# Patient Record
Sex: FEMALE | Race: WHITE | Employment: FULL TIME | URBAN - METROPOLITAN AREA
[De-identification: names, ages, dates, MRNs, and addresses within clinical notes are randomized per-mention and may not be internally consistent; named-entity substitution may affect disease eponyms.]

---

## 2017-02-15 RX ORDER — SODIUM CHLORIDE, SODIUM LACTATE, POTASSIUM CHLORIDE, CALCIUM CHLORIDE 600; 310; 30; 20 MG/100ML; MG/100ML; MG/100ML; MG/100ML
INJECTION, SOLUTION INTRAVENOUS CONTINUOUS
Status: CANCELLED | OUTPATIENT
Start: 2017-02-15

## 2017-02-16 ENCOUNTER — HOSPITAL ENCOUNTER (OUTPATIENT)
Dept: OTHER | Age: 47
Discharge: HOME | End: 2017-03-07
Attending: INTERNAL MEDICINE | Admitting: INTERNAL MEDICINE

## 2017-02-22 PROBLEM — K56.609 SBO (SMALL BOWEL OBSTRUCTION) (HCC): Status: ACTIVE | Noted: 2017-02-22

## 2017-03-06 ENCOUNTER — TELEPHONE (OUTPATIENT)
Dept: SURGERY | Age: 47
End: 2017-03-06

## 2017-03-06 DIAGNOSIS — R11.2 NAUSEA AND VOMITING, INTRACTABILITY OF VOMITING NOT SPECIFIED, UNSPECIFIED VOMITING TYPE: Primary | ICD-10-CM

## 2017-03-06 RX ORDER — AMOXICILLIN AND CLAVULANATE POTASSIUM 875; 125 MG/1; MG/1
1 TABLET, FILM COATED ORAL 2 TIMES DAILY
Qty: 20 TABLET | Refills: 0 | Status: SHIPPED | OUTPATIENT
Start: 2017-03-06 | End: 2017-03-16

## 2017-03-07 ENCOUNTER — OFFICE VISIT (OUTPATIENT)
Dept: SURGERY | Age: 47
End: 2017-03-07

## 2017-03-07 VITALS
HEIGHT: 65 IN | BODY MASS INDEX: 30.46 KG/M2 | WEIGHT: 182.8 LBS | DIASTOLIC BLOOD PRESSURE: 82 MMHG | SYSTOLIC BLOOD PRESSURE: 120 MMHG

## 2017-03-07 DIAGNOSIS — Z09 POSTOP CHECK: Primary | ICD-10-CM

## 2017-03-07 PROCEDURE — 99024 POSTOP FOLLOW-UP VISIT: CPT | Performed by: SURGERY

## 2017-03-08 RX ORDER — ONDANSETRON 4 MG/1
4 TABLET, FILM COATED ORAL EVERY 8 HOURS PRN
Qty: 20 TABLET | Refills: 0 | OUTPATIENT
Start: 2017-03-08 | End: 2018-01-11 | Stop reason: ALTCHOICE

## 2017-03-14 ENCOUNTER — OFFICE VISIT (OUTPATIENT)
Dept: SURGERY | Age: 47
End: 2017-03-14

## 2017-03-14 VITALS
WEIGHT: 181.2 LBS | HEIGHT: 65 IN | DIASTOLIC BLOOD PRESSURE: 78 MMHG | BODY MASS INDEX: 30.19 KG/M2 | HEART RATE: 72 BPM | SYSTOLIC BLOOD PRESSURE: 118 MMHG

## 2017-03-14 DIAGNOSIS — Z09 POSTOP CHECK: Primary | ICD-10-CM

## 2017-03-14 PROBLEM — T81.49XA POSTOPERATIVE WOUND INFECTION: Status: ACTIVE | Noted: 2017-03-14

## 2017-03-14 PROCEDURE — 99024 POSTOP FOLLOW-UP VISIT: CPT | Performed by: SURGERY

## 2017-04-04 ENCOUNTER — OFFICE VISIT (OUTPATIENT)
Dept: SURGERY | Age: 47
End: 2017-04-04

## 2017-04-04 VITALS
SYSTOLIC BLOOD PRESSURE: 123 MMHG | WEIGHT: 177.8 LBS | BODY MASS INDEX: 28.57 KG/M2 | DIASTOLIC BLOOD PRESSURE: 84 MMHG | HEIGHT: 66 IN | HEART RATE: 86 BPM

## 2017-04-04 DIAGNOSIS — Z09 POSTOP CHECK: Primary | ICD-10-CM

## 2017-04-04 PROCEDURE — 99024 POSTOP FOLLOW-UP VISIT: CPT | Performed by: SURGERY

## 2017-10-20 ENCOUNTER — OFFICE VISIT (OUTPATIENT)
Dept: SURGERY | Age: 47
End: 2017-10-20

## 2017-10-20 VITALS
BODY MASS INDEX: 29.57 KG/M2 | HEART RATE: 85 BPM | DIASTOLIC BLOOD PRESSURE: 81 MMHG | WEIGHT: 184 LBS | SYSTOLIC BLOOD PRESSURE: 116 MMHG | HEIGHT: 66 IN

## 2017-10-20 DIAGNOSIS — K43.2 INCISIONAL HERNIA, WITHOUT OBSTRUCTION OR GANGRENE: ICD-10-CM

## 2017-10-20 PROCEDURE — 99213 OFFICE O/P EST LOW 20 MIN: CPT | Performed by: SURGERY

## 2017-10-20 NOTE — PATIENT INSTRUCTIONS
[address] [address]  Phone: [phone]  Fax: [phone] will be scheduled for surgery with Dr. Jass Vela. · The office will call you with the date and time that surgery is scheduled. · Please take note of these instructions for surgery:  · You should have nothing by mouth after midnight the night before your surgery - this includes no food or water. · Your surgery will be cancelled if you have taken anything by mouth after midnight, NO exceptions. · You will need to have a history and physical prior to your surgery. This will need to be completed up to 30 days before your surgery. This H/P can be completed by your family doctor or the hospital.   · IF you take coumadin (warfarin), please stop taking this medication 5 days prior to your surgery. · IF you take plavix, please stop taking this 7 days prior to your surgery. · Please contact our office if you have a pacemaker or defibrillator. · IF you are allergic to latex, please tell our office prior to your surgery. This is important in know before scheduling your surgery. · IF you are having an out patient surgery, you will need someone available to drive you home after your surgery, and to also stay with you for the rest of the day. · IF you are having a surgery requiring an inpatient stay in the hospital, you will need someone to drive you home upon discharge from the hospital.  · Please contact Dr. Luis Eduardo Geller assistant Braden Alexander if you have any questions or concerns. · Please call the office with any changes in your symptoms or further questions/concerns.  [phone]

## 2017-10-20 NOTE — PROGRESS NOTES
New Patient [address] Surgery Claudy DUNBAR [address] [facility], [address] [facility], [address] [facility], [address] [facility]  Phone: [phone]  Fax: [phone] [facility]   YOB: 1970    Date of Visit:  10/20/2017    No ref. provider found  [place]    HPI:   Hernia: Patient is 52y.o. year old female seen at request of [facility]. Patient presents for evaluation of  midline incisional hernia. Symptoms were first noted a few weeks ago. Pain is dull, intermittent. There is a lump or mass present. Lump is reducible. Pt does not have risk factors of  chronic constipation, chronic cough, difficulty urinating, chronic tobacco abuse. Pt. does not have previous history of prior hernia surgery. Pt underwent ex lap, lysis of adhesions and small bowel resection ~8 months ago for SBO, and pt has had multiple prior abdominal surgeries through her midline.        Allergies   Allergen Reactions    Amoxicillin      dehydration    Eggs [Egg White]      Severe stomach pain    Other      Flu immunization     Outpatient Prescriptions Marked as Taking for the 10/20/17 encounter (Office Visit) with Marilynn Broussard MD   Medication Sig Dispense Refill    ondansetron (ZOFRAN) 4 MG tablet Take 1 tablet by mouth every 8 hours as needed for Nausea or Vomiting 20 tablet 0    Methocarbamol (ROBAXIN-750 PO) Take by mouth 2 times daily      meclizine (ANTIVERT) 12.5 MG tablet Take 12.5 mg by mouth 3 times daily as needed      omeprazole (PRILOSEC) 10 MG delayed release capsule Take 10 mg by mouth daily         Past Medical History:   Diagnosis Date    Anxiety     Fibromyalgia     Irritable bowel syndrome      Past Surgical History:   Procedure Laterality Date    ABDOMEN SURGERY      ruptured bowel    ABDOMINAL ADHESION SURGERY      ABDOMINAL EXPLORATION SURGERY N/A 02/24/2017    explor lap possible lysis of adhsions possible bowel resection    APPENDECTOMY      CHOLECYSTECTOMY      CHOLECYSTECTOMY, LAPAROSCOPIC N/A 3/28/13    LYSIS OF ADHESIONS    CYSTOSCOPY  9/11/12    lyssa stents placed for surgery and removed    HYSTERECTOMY      PELVIC LAPAROSCOPY  7/3/12    ROTATOR CUFF REPAIR      SALPINGO-OOPHORECTOMY  9/11/12    BILATERAL SALPINGO-OOPHORECTOMY; LYSIS OF ADHESIONS;;RETROPERITONEAL RESECTION DISTAL URETERS    SHOULDER ARTHROSCOPY Left 7/8/14    TUBAL LIGATION       Family History   Problem Relation Age of Onset    Diabetes Mother     Cancer Father      liver    Cancer Sister      breast    Cancer Brother      spinal    Cancer Maternal Aunt      Breast    Cancer Paternal Aunt      Breast    Cancer Daughter      Cervical     Social History     Social History    Marital status:      Spouse name: N/A    Number of children: N/A    Years of education: N/A     Occupational History    Not on file. Social History Main Topics    Smoking status: Current Every Day Smoker     Packs/day: 0.50     Years: 20.00     Types: Cigarettes    Smokeless tobacco: Never Used    Alcohol use No    Drug use: No    Sexual activity: No     Other Topics Concern    Not on file     Social History Narrative    No narrative on file          Vitals:    10/20/17 1243   BP: 116/81   Site: Right Arm   Position: Sitting   Cuff Size: Medium Adult   Pulse: 85   Weight: 184 lb (83.5 kg)   Height: 5' 6\" (1.676 m)     Body mass index is 29.7 kg/m².      Wt Readings from Last 3 Encounters:   10/20/17 184 lb (83.5 kg)   04/04/17 177 lb 12.8 oz (80.6 kg)   03/14/17 181 lb 3.2 oz (82.2 kg)     BP Readings from Last 3 Encounters:   10/20/17 116/81   04/04/17 123/84   03/14/17 118/78          REVIEW OF SYSTEMS:   · All other systems reviewed; please refer to HPI with pertinent positives, all other ROS are negative    PHYSICAL EXAM:    CONSTITUTIONAL:  awake, alert, no apparent distress and normal weight  ENT:  normocepalic, without obvious abnormality  NECK:

## 2017-10-27 ENCOUNTER — HOSPITAL ENCOUNTER (OUTPATIENT)
Dept: CT IMAGING | Age: 47
Discharge: HOME | End: 2017-10-27
Attending: SURGERY | Admitting: SURGERY

## 2017-10-27 DIAGNOSIS — K43.2 INCISIONAL HERNIA, WITHOUT OBSTRUCTION OR GANGRENE: ICD-10-CM

## 2018-01-09 ENCOUNTER — PAT TELEPHONE (OUTPATIENT)
Dept: PREADMISSION TESTING | Age: 48
End: 2018-01-09

## 2018-01-11 VITALS — BODY MASS INDEX: 27.48 KG/M2 | WEIGHT: 171 LBS | HEIGHT: 66 IN

## 2018-01-11 RX ORDER — ESOMEPRAZOLE MAGNESIUM 20 MG/1
20 FOR SUSPENSION ORAL DAILY
COMMUNITY
End: 2018-05-16 | Stop reason: DRUGHIGH

## 2018-01-11 RX ORDER — METOPROLOL SUCCINATE 25 MG/1
25 TABLET, EXTENDED RELEASE ORAL DAILY
COMMUNITY

## 2018-01-16 NOTE — ANESTHESIA PRE-OP
Department of Anesthesiology  Preprocedure Note       Name:  Melissa Muir   Age:  52 y.o.  :  1970                                          MRN:  [id number]         Date:  2018      Surgeon:    Procedure:    Medications prior to admission:   Prior to Admission medications    Medication Sig Start Date End Date Taking? Authorizing Provider   metoprolol succinate (TOPROL XL) 25 MG extended release tablet Take 25 mg by mouth daily    Historical Provider, MD   esomeprazole Magnesium (NEXIUM) 20 MG PACK Take 20 mg by mouth daily    Historical Provider, MD   Methocarbamol (ROBAXIN-750 PO) Take by mouth as needed     Historical Provider, MD   meclizine (ANTIVERT) 12.5 MG tablet Take 12.5 mg by mouth 3 times daily as needed    Historical Provider, MD       Current medications:    Current Outpatient Prescriptions   Medication Sig Dispense Refill    metoprolol succinate (TOPROL XL) 25 MG extended release tablet Take 25 mg by mouth daily      esomeprazole Magnesium (NEXIUM) 20 MG PACK Take 20 mg by mouth daily      Methocarbamol (ROBAXIN-750 PO) Take by mouth as needed       meclizine (ANTIVERT) 12.5 MG tablet Take 12.5 mg by mouth 3 times daily as needed       No current facility-administered medications for this encounter. Allergies:     Allergies   Allergen Reactions    Amoxicillin      dehydration    Eggs [Egg White]      Severe stomach pain    Other Other (See Comments)     Flu immunization; severely sick due to egg whites in shot       Problem List:    Patient Active Problem List   Diagnosis Code    Nausea R11.0    Ovarian cyst N83.209    Pelvic pain in female R10.2    Depression screening Z13.89    Chronic pelvic pain in female R10.2, G89.29    Postop check Z09    Chest pain R07.9    IBS (irritable bowel syndrome) K58.9    Anxiety F41.9    Flank pain R10.9    Impingement syndrome of shoulder M75.40    SBO (small bowel obstruction) K56.609    Generalized abdominal pain R10.84   

## 2018-01-17 ENCOUNTER — HOSPITAL ENCOUNTER (OUTPATIENT)
Dept: SURGERY | Age: 48
Discharge: HOME | End: 2018-01-17
Attending: SURGERY | Admitting: SURGERY

## 2018-01-17 VITALS
OXYGEN SATURATION: 94 % | DIASTOLIC BLOOD PRESSURE: 67 MMHG | RESPIRATION RATE: 16 BRPM | SYSTOLIC BLOOD PRESSURE: 97 MMHG | WEIGHT: 183 LBS | HEART RATE: 75 BPM | TEMPERATURE: 99.4 F | HEIGHT: 66 IN | BODY MASS INDEX: 29.41 KG/M2

## 2018-01-17 DIAGNOSIS — K43.2 INCISIONAL HERNIA, WITHOUT OBSTRUCTION OR GANGRENE: Primary | ICD-10-CM

## 2018-01-17 PROCEDURE — 49654 PR LAP, INCISIONAL HERNIA REPAIR,REDUCIBLE: CPT | Performed by: SURGERY

## 2018-01-17 RX ORDER — OXYCODONE HYDROCHLORIDE AND ACETAMINOPHEN 5; 325 MG/1; MG/1
1 TABLET ORAL PRN
Status: ACTIVE | OUTPATIENT
Start: 2018-01-17 | End: 2018-01-17

## 2018-01-17 RX ORDER — MEPERIDINE HYDROCHLORIDE 50 MG/ML
12.5 INJECTION INTRAMUSCULAR; INTRAVENOUS; SUBCUTANEOUS EVERY 5 MIN PRN
Status: DISCONTINUED | OUTPATIENT
Start: 2018-01-17 | End: 2018-01-18 | Stop reason: HOSPADM

## 2018-01-17 RX ORDER — SODIUM CHLORIDE 0.9 % (FLUSH) 0.9 %
10 SYRINGE (ML) INJECTION PRN
Status: DISCONTINUED | OUTPATIENT
Start: 2018-01-17 | End: 2018-01-18 | Stop reason: HOSPADM

## 2018-01-17 RX ORDER — MORPHINE SULFATE 2 MG/ML
2 INJECTION, SOLUTION INTRAMUSCULAR; INTRAVENOUS EVERY 5 MIN PRN
Status: DISCONTINUED | OUTPATIENT
Start: 2018-01-17 | End: 2018-01-18 | Stop reason: HOSPADM

## 2018-01-17 RX ORDER — HYDRALAZINE HYDROCHLORIDE 20 MG/ML
5 INJECTION INTRAMUSCULAR; INTRAVENOUS
Status: DISCONTINUED | OUTPATIENT
Start: 2018-01-17 | End: 2018-01-18 | Stop reason: HOSPADM

## 2018-01-17 RX ORDER — DIPHENHYDRAMINE HYDROCHLORIDE 50 MG/ML
12.5 INJECTION INTRAMUSCULAR; INTRAVENOUS
Status: ACTIVE | OUTPATIENT
Start: 2018-01-17 | End: 2018-01-17

## 2018-01-17 RX ORDER — OXYCODONE HYDROCHLORIDE AND ACETAMINOPHEN 5; 325 MG/1; MG/1
1-2 TABLET ORAL EVERY 4 HOURS PRN
Qty: 30 TABLET | Refills: 0 | Status: SHIPPED | OUTPATIENT
Start: 2018-01-17 | End: 2018-01-24

## 2018-01-17 RX ORDER — OXYCODONE HYDROCHLORIDE AND ACETAMINOPHEN 5; 325 MG/1; MG/1
2 TABLET ORAL PRN
Status: ACTIVE | OUTPATIENT
Start: 2018-01-17 | End: 2018-01-17

## 2018-01-17 RX ORDER — SODIUM CHLORIDE, SODIUM LACTATE, POTASSIUM CHLORIDE, CALCIUM CHLORIDE 600; 310; 30; 20 MG/100ML; MG/100ML; MG/100ML; MG/100ML
INJECTION, SOLUTION INTRAVENOUS
Status: DISPENSED
Start: 2018-01-17 | End: 2018-01-17

## 2018-01-17 RX ORDER — LIDOCAINE HYDROCHLORIDE 10 MG/ML
1 INJECTION, SOLUTION EPIDURAL; INFILTRATION; INTRACAUDAL; PERINEURAL
Status: ACTIVE | OUTPATIENT
Start: 2018-01-17 | End: 2018-01-17

## 2018-01-17 RX ORDER — MORPHINE SULFATE 2 MG/ML
1 INJECTION, SOLUTION INTRAMUSCULAR; INTRAVENOUS EVERY 5 MIN PRN
Status: DISCONTINUED | OUTPATIENT
Start: 2018-01-17 | End: 2018-01-18 | Stop reason: HOSPADM

## 2018-01-17 RX ORDER — SODIUM CHLORIDE, SODIUM LACTATE, POTASSIUM CHLORIDE, CALCIUM CHLORIDE 600; 310; 30; 20 MG/100ML; MG/100ML; MG/100ML; MG/100ML
INJECTION, SOLUTION INTRAVENOUS CONTINUOUS
Status: DISCONTINUED | OUTPATIENT
Start: 2018-01-17 | End: 2018-01-18 | Stop reason: HOSPADM

## 2018-01-17 RX ORDER — ONDANSETRON 2 MG/ML
4 INJECTION INTRAMUSCULAR; INTRAVENOUS
Status: ACTIVE | OUTPATIENT
Start: 2018-01-17 | End: 2018-01-17

## 2018-01-17 RX ORDER — LABETALOL HYDROCHLORIDE 5 MG/ML
5 INJECTION, SOLUTION INTRAVENOUS EVERY 10 MIN PRN
Status: DISCONTINUED | OUTPATIENT
Start: 2018-01-17 | End: 2018-01-18 | Stop reason: HOSPADM

## 2018-01-17 RX ORDER — SODIUM CHLORIDE 0.9 % (FLUSH) 0.9 %
10 SYRINGE (ML) INJECTION EVERY 12 HOURS SCHEDULED
Status: DISCONTINUED | OUTPATIENT
Start: 2018-01-17 | End: 2018-01-18 | Stop reason: HOSPADM

## 2018-01-17 RX ADMIN — Medication 0.5 MG: at 11:29

## 2018-01-17 RX ADMIN — Medication 0.5 MG: at 11:39

## 2018-01-17 RX ADMIN — Medication 0.25 MG: at 11:24

## 2018-01-17 RX ADMIN — Medication 0.25 MG: at 11:20

## 2018-01-17 ASSESSMENT — PAIN SCALES - GENERAL
PAINLEVEL_OUTOF10: 0
PAINLEVEL_OUTOF10: 7
PAINLEVEL_OUTOF10: 0
PAINLEVEL_OUTOF10: 6
PAINLEVEL_OUTOF10: 5
PAINLEVEL_OUTOF10: 7

## 2018-01-17 ASSESSMENT — PAIN - FUNCTIONAL ASSESSMENT: PAIN_FUNCTIONAL_ASSESSMENT: 0-10

## 2018-01-17 NOTE — PROGRESS NOTES
Transfer received from PACU- report received. Pt sleeping, arouses to voice, denies pain. Repositioned with HOB elevated. Family called to bedside, call light in reach.

## 2018-01-17 NOTE — ANESTHESIA POST-OP
Postoperative Anesthesia Note    Name:    Fidel Tian  MRN:      [id number]    Patient Vitals for the past 12 hrs:   BP Temp Temp src Pulse Resp SpO2 Height Weight   01/17/18 1315 - - - - - 94 % - -   01/17/18 1243 97/67 - - 75 16 93 % - -   01/17/18 1215 - - - - - (!) 79 % - -   01/17/18 1203 109/74 99.4 °F (37.4 °C) Temporal 77 12 90 % - -   01/17/18 1152 - - - 93 19 96 % - -   01/17/18 1150 - - - 86 28 94 % - -   01/17/18 1145 121/76 - - 96 17 97 % - -   01/17/18 1130 126/77 - - 95 12 (!) 88 % - -   01/17/18 1121 - 97.9 °F (36.6 °C) Temporal - - - - -   01/17/18 1120 132/81 - - 92 11 94 % - -   01/17/18 1115 122/85 - - 102 14 93 % - -   01/17/18 1108 130/81 - - 73 11 98 % - -   01/17/18 1100 130/77 - - 95 12 (!) 86 % - -   01/17/18 1050 130/72 - - 110 18 97 % - -   01/17/18 1045 136/89 - - 99 10 (!) 84 % - -   01/17/18 1040 (!) 150/95 98.4 °F (36.9 °C) Temporal 103 16 93 % - -   01/17/18 0647 98/64 97.6 °F (36.4 °C) Temporal 76 16 97 % 5' 6\" (1.676 m) 183 lb (83 kg)        LABS:    CBC  Lab Results   Component Value Date/Time    WBC 8.0 03/02/2017 10:27 AM    HGB 12.1 03/02/2017 10:27 AM    HCT 35.3 (L) 03/02/2017 10:27 AM     03/02/2017 10:27 AM     RENAL  Lab Results   Component Value Date/Time     02/26/2017 07:02 AM    K 3.8 02/26/2017 07:02 AM    CL 97 (L) 02/26/2017 07:02 AM    CO2 17 (L) 02/26/2017 07:02 AM    BUN 14 02/26/2017 07:02 AM    CREATININE <0.5 (L) 02/26/2017 07:02 AM    GLUCOSE 81 02/26/2017 07:02 AM     COAGS  Lab Results   Component Value Date/Time    PROTIME 10.9 11/19/2015 11:45 PM    INR 0.96 11/19/2015 11:45 PM    APTT 32.6 02/19/2013 06:33 PM       Intake & Output: In: 1400 [I.V.:1400]  Out: 10     Nausea & Vomiting:  No    Level of Consciousness:  Awake    Pain Assessment:  Adequate analgesia    Anesthesia Complications:  No apparent anesthetic complications    SUMMARY      Vital signs stable  OK to discharge from Stage I post anesthesia care.   Care transferred from

## 2018-01-18 LAB
EKG ATRIAL RATE: 66 BPM
EKG DIAGNOSIS: NORMAL
EKG P AXIS: 39 DEGREES
EKG P-R INTERVAL: 188 MS
EKG Q-T INTERVAL: 414 MS
EKG QRS DURATION: 92 MS
EKG QTC CALCULATION (BAZETT): 434 MS
EKG R AXIS: 22 DEGREES
EKG T AXIS: 17 DEGREES
EKG VENTRICULAR RATE: 66 BPM

## 2018-01-18 PROCEDURE — 93010 ELECTROCARDIOGRAM REPORT: CPT | Performed by: INTERNAL MEDICINE

## 2018-01-18 NOTE — OP NOTE
of the mesh. The mesh was then folded and inserted  into the abdominal cavity. We positioned the mesh with the abdominal wall  side facing outward and the assistant was then be able to pass the suture  grasper through stab incisions and grasped the Vicryl sutures to pull mesh  up and secured in the midline and centering along the fascial closure line. We now used 2 running 0 barbed sutures to secure the perimeter of the  mesh which was accomplished without difficulty. The robot was undocked. Final steps for fixation of mesh were to place the  total of four #1 [place] sutures in a transfascial manner at the 12, 3, 6,  9 o'clock positions on the mesh. This was done under laparoscopic guidance  with this suture passed down through stab incisions and the skin to the  abdominal wall and mesh and backup with approximately 1 cm bite of the  mesh. This secured the mesh nicely and I was satisfied with the fixation  at this point. The 12-mm trocar was removed and a figure-of-eight 0 Vicryl  suture passed around the fascial defect under laparoscopic guidance. The  remaining trocars were then removed and the insufflation released. The  skin incisions were closed 4-0 Monocryl subcuticular sutures and Dermabond. The patient was then extubated and taken to the recovery room in stable  condition.         Devaughn Strickland MD    D: 01/18/2018 8:03:17       T: 01/18/2018 8:05:20     [email or web address]  Job#: [id number]     Doc#: [id number]    CC:  Bryan Garay

## 2018-01-30 ENCOUNTER — OFFICE VISIT (OUTPATIENT)
Dept: SURGERY | Age: 48
End: 2018-01-30

## 2018-01-30 VITALS
SYSTOLIC BLOOD PRESSURE: 122 MMHG | WEIGHT: 182 LBS | HEIGHT: 66 IN | HEART RATE: 88 BPM | DIASTOLIC BLOOD PRESSURE: 89 MMHG | BODY MASS INDEX: 29.25 KG/M2

## 2018-01-30 DIAGNOSIS — Z09 POSTOP CHECK: Primary | ICD-10-CM

## 2018-01-30 PROCEDURE — 99024 POSTOP FOLLOW-UP VISIT: CPT | Performed by: SURGERY

## 2018-01-30 NOTE — PROGRESS NOTES
Surgery Post-op Progress Note    HPI:  Notes reviewed, and agree with documentation in pt's chart. Postoperative Follow-up: Patient presents for 2 week follow-up status post robotic repair of incisional hernia w/ mesh . Eating a regular diet without difficulty. Bowel movements are Normal.  Pain is controlled without any medications. ROS:    · 10 point review of systems performed; please refer to HPI with pertinent positives, all other ROS are negative    PE:   CONSTITUTIONAL:  awake and alert    ABDOMEN: soft, non-distended, non-tender     INCISION: clean, dry, no drainage, healing      ASSESSMENT:  1.  Postop check     ·       PLAN:    Continue with routine wound care as discussed  Gradually increase activities as tolerated  Follow-up as needed; please call with questions or concerns  ·

## 2018-05-16 ENCOUNTER — INITIAL CONSULT (OUTPATIENT)
Dept: GASTROENTEROLOGY | Age: 48
End: 2018-05-16

## 2018-05-16 VITALS
WEIGHT: 185 LBS | DIASTOLIC BLOOD PRESSURE: 88 MMHG | HEIGHT: 66 IN | BODY MASS INDEX: 29.73 KG/M2 | SYSTOLIC BLOOD PRESSURE: 120 MMHG

## 2018-05-16 DIAGNOSIS — R13.10 DYSPHAGIA, UNSPECIFIED TYPE: ICD-10-CM

## 2018-05-16 DIAGNOSIS — K21.9 GASTROESOPHAGEAL REFLUX DISEASE, ESOPHAGITIS PRESENCE NOT SPECIFIED: Primary | ICD-10-CM

## 2018-05-16 DIAGNOSIS — Z86.010 HISTORY OF COLON POLYPS: ICD-10-CM

## 2018-05-16 DIAGNOSIS — R19.8 ALTERNATING CONSTIPATION AND DIARRHEA: ICD-10-CM

## 2018-05-16 PROBLEM — Z86.0100 HISTORY OF COLON POLYPS: Status: ACTIVE | Noted: 2018-05-16

## 2018-05-16 PROCEDURE — 99204 OFFICE O/P NEW MOD 45 MIN: CPT | Performed by: INTERNAL MEDICINE

## 2018-05-16 RX ORDER — ESOMEPRAZOLE MAGNESIUM 40 MG/1
40 CAPSULE, DELAYED RELEASE ORAL
Qty: 60 CAPSULE | Refills: 3 | Status: SHIPPED | OUTPATIENT
Start: 2018-05-16 | End: 2018-11-19 | Stop reason: SDUPTHER

## 2018-05-29 ENCOUNTER — TELEPHONE (OUTPATIENT)
Dept: GASTROENTEROLOGY | Age: 48
End: 2018-05-29

## 2018-05-30 ENCOUNTER — HOSPITAL ENCOUNTER (OUTPATIENT)
Dept: OTHER | Age: 48
Discharge: HOME | End: 2018-05-31

## 2018-05-30 ENCOUNTER — HOSPITAL ENCOUNTER (OUTPATIENT)
Dept: SURGERY | Age: 48
Discharge: HOME | End: 2018-05-30
Attending: INTERNAL MEDICINE | Admitting: INTERNAL MEDICINE

## 2018-05-30 VITALS
HEART RATE: 69 BPM | DIASTOLIC BLOOD PRESSURE: 62 MMHG | OXYGEN SATURATION: 100 % | RESPIRATION RATE: 18 BRPM | TEMPERATURE: 97.8 F | BODY MASS INDEX: 29.73 KG/M2 | SYSTOLIC BLOOD PRESSURE: 112 MMHG | WEIGHT: 185 LBS | HEIGHT: 66 IN

## 2018-05-30 DIAGNOSIS — K22.2 LOWER ESOPHAGEAL RING: ICD-10-CM

## 2018-05-30 PROCEDURE — 43249 ESOPH EGD DILATION <30 MM: CPT | Performed by: INTERNAL MEDICINE

## 2018-05-30 PROCEDURE — 45378 DIAGNOSTIC COLONOSCOPY: CPT | Performed by: INTERNAL MEDICINE

## 2018-05-30 PROCEDURE — 43239 EGD BIOPSY SINGLE/MULTIPLE: CPT | Performed by: INTERNAL MEDICINE

## 2018-05-30 RX ORDER — DIPHENHYDRAMINE HYDROCHLORIDE 50 MG/ML
12.5 INJECTION INTRAMUSCULAR; INTRAVENOUS
Status: ACTIVE | OUTPATIENT
Start: 2018-05-30 | End: 2018-05-30

## 2018-05-30 RX ORDER — HYDRALAZINE HYDROCHLORIDE 20 MG/ML
5 INJECTION INTRAMUSCULAR; INTRAVENOUS EVERY 10 MIN PRN
Status: DISCONTINUED | OUTPATIENT
Start: 2018-05-30 | End: 2018-05-31 | Stop reason: HOSPADM

## 2018-05-30 RX ORDER — MORPHINE SULFATE 10 MG/ML
1 INJECTION, SOLUTION INTRAMUSCULAR; INTRAVENOUS EVERY 5 MIN PRN
Status: DISCONTINUED | OUTPATIENT
Start: 2018-05-30 | End: 2018-05-31 | Stop reason: HOSPADM

## 2018-05-30 RX ORDER — OXYCODONE HYDROCHLORIDE AND ACETAMINOPHEN 5; 325 MG/1; MG/1
2 TABLET ORAL PRN
Status: ACTIVE | OUTPATIENT
Start: 2018-05-30 | End: 2018-05-30

## 2018-05-30 RX ORDER — OXYCODONE HYDROCHLORIDE AND ACETAMINOPHEN 5; 325 MG/1; MG/1
1 TABLET ORAL PRN
Status: ACTIVE | OUTPATIENT
Start: 2018-05-30 | End: 2018-05-30

## 2018-05-30 RX ORDER — MEPERIDINE HYDROCHLORIDE 25 MG/ML
12.5 INJECTION INTRAMUSCULAR; INTRAVENOUS; SUBCUTANEOUS EVERY 5 MIN PRN
Status: DISCONTINUED | OUTPATIENT
Start: 2018-05-30 | End: 2018-05-31 | Stop reason: HOSPADM

## 2018-05-30 RX ORDER — ONDANSETRON 2 MG/ML
4 INJECTION INTRAMUSCULAR; INTRAVENOUS PRN
Status: DISCONTINUED | OUTPATIENT
Start: 2018-05-30 | End: 2018-05-31 | Stop reason: HOSPADM

## 2018-05-30 RX ORDER — MORPHINE SULFATE 10 MG/ML
2 INJECTION, SOLUTION INTRAMUSCULAR; INTRAVENOUS EVERY 5 MIN PRN
Status: DISCONTINUED | OUTPATIENT
Start: 2018-05-30 | End: 2018-05-31 | Stop reason: HOSPADM

## 2018-05-30 RX ORDER — PROMETHAZINE HYDROCHLORIDE 25 MG/ML
6.25 INJECTION, SOLUTION INTRAMUSCULAR; INTRAVENOUS
Status: DISCONTINUED | OUTPATIENT
Start: 2018-05-30 | End: 2018-05-31 | Stop reason: HOSPADM

## 2018-05-30 RX ORDER — LABETALOL HYDROCHLORIDE 5 MG/ML
5 INJECTION, SOLUTION INTRAVENOUS EVERY 10 MIN PRN
Status: DISCONTINUED | OUTPATIENT
Start: 2018-05-30 | End: 2018-05-31 | Stop reason: HOSPADM

## 2018-05-30 ASSESSMENT — PAIN - FUNCTIONAL ASSESSMENT: PAIN_FUNCTIONAL_ASSESSMENT: 0-10

## 2018-05-30 ASSESSMENT — PAIN SCALES - GENERAL
PAINLEVEL_OUTOF10: 0

## 2018-06-01 ENCOUNTER — TELEPHONE (OUTPATIENT)
Dept: GASTROENTEROLOGY | Age: 48
End: 2018-06-01

## 2018-06-04 DIAGNOSIS — A04.8 H. PYLORI INFECTION: Primary | ICD-10-CM

## 2018-06-04 RX ORDER — METRONIDAZOLE 250 MG/1
250 TABLET ORAL 4 TIMES DAILY
Qty: 56 TABLET | Refills: 0 | Status: SHIPPED | OUTPATIENT
Start: 2018-06-04 | End: 2018-06-18

## 2018-06-04 RX ORDER — TETRACYCLINE HYDROCHLORIDE 500 MG/1
500 CAPSULE ORAL 4 TIMES DAILY
Qty: 56 CAPSULE | Refills: 0 | Status: SHIPPED | OUTPATIENT
Start: 2018-06-04 | End: 2018-06-26 | Stop reason: ALTCHOICE

## 2018-06-26 ENCOUNTER — OFFICE VISIT (OUTPATIENT)
Dept: GASTROENTEROLOGY | Age: 48
End: 2018-06-26

## 2018-06-26 ENCOUNTER — HOSPITAL ENCOUNTER (OUTPATIENT)
Dept: OTHER | Age: 48
Discharge: HOME | End: 2018-06-26
Attending: FAMILY MEDICINE | Admitting: FAMILY MEDICINE

## 2018-06-26 VITALS
HEIGHT: 66 IN | WEIGHT: 185.6 LBS | DIASTOLIC BLOOD PRESSURE: 80 MMHG | BODY MASS INDEX: 29.83 KG/M2 | SYSTOLIC BLOOD PRESSURE: 118 MMHG

## 2018-06-26 DIAGNOSIS — K29.70 HELICOBACTER POSITIVE GASTRITIS: ICD-10-CM

## 2018-06-26 DIAGNOSIS — K76.0 FATTY LIVER DISEASE, NONALCOHOLIC: ICD-10-CM

## 2018-06-26 DIAGNOSIS — R74.8 ABNORMAL LIVER ENZYMES: ICD-10-CM

## 2018-06-26 DIAGNOSIS — R13.19 ESOPHAGEAL DYSPHAGIA: ICD-10-CM

## 2018-06-26 DIAGNOSIS — B96.81 HELICOBACTER POSITIVE GASTRITIS: ICD-10-CM

## 2018-06-26 DIAGNOSIS — R19.8 ALTERNATING CONSTIPATION AND DIARRHEA: ICD-10-CM

## 2018-06-26 DIAGNOSIS — K22.2 LOWER ESOPHAGEAL RING: Primary | ICD-10-CM

## 2018-06-26 DIAGNOSIS — K21.9 GASTROESOPHAGEAL REFLUX DISEASE WITHOUT ESOPHAGITIS: ICD-10-CM

## 2018-06-26 LAB
ALBUMIN SERPL-MCNC: 3.9 G/DL (ref 3.4–5)
ALP BLD-CCNC: 127 U/L (ref 40–129)
ALT SERPL-CCNC: 27 U/L (ref 10–40)
AST SERPL-CCNC: 24 U/L (ref 15–37)
BILIRUB SERPL-MCNC: 0.3 MG/DL (ref 0–1)
BILIRUBIN DIRECT: <0.2 MG/DL (ref 0–0.3)
BILIRUBIN, INDIRECT: NORMAL MG/DL (ref 0–1)
FERRITIN: 175.1 NG/ML (ref 15–150)
HCT VFR BLD CALC: 40 % (ref 36–48)
HEMOGLOBIN: 14 G/DL (ref 12–16)
HEPATITIS B SURFACE ANTIGEN INTERPRETATION: NORMAL
HEPATITIS C ANTIBODY INTERPRETATION: NORMAL
IGA: 130 MG/DL (ref 70–400)
IRON SATURATION: 31 % (ref 15–50)
IRON: 63 UG/DL (ref 37–145)
MCH RBC QN AUTO: 33 PG (ref 26–34)
MCHC RBC AUTO-ENTMCNC: 35.1 G/DL (ref 31–36)
MCV RBC AUTO: 94 FL (ref 80–100)
PDW BLD-RTO: 12.8 % (ref 12.4–15.4)
PLATELET # BLD: 245 K/UL (ref 135–450)
PMV BLD AUTO: 9.6 FL (ref 5–10.5)
RBC # BLD: 4.25 M/UL (ref 4–5.2)
TOTAL IRON BINDING CAPACITY: 202 UG/DL (ref 260–445)
TOTAL PROTEIN: 6.7 G/DL (ref 6.4–8.2)
TSH REFLEX: 1.53 UIU/ML (ref 0.27–4.2)
WBC # BLD: 7.3 K/UL (ref 4–11)

## 2018-06-26 PROCEDURE — 99213 OFFICE O/P EST LOW 20 MIN: CPT | Performed by: INTERNAL MEDICINE

## 2018-06-28 LAB
MITOCHONDRIAL M2 AB, IGG: 2.8 UNITS (ref 0–20)
TISSUE TRANSGLUTAMINASE IGA: 0 U/ML (ref 0–3)

## 2018-06-30 LAB — ANA BY IFA: NORMAL

## 2018-07-19 ENCOUNTER — APPOINTMENT (OUTPATIENT)
Dept: CT IMAGING | Age: 48
End: 2018-07-19
Payer: COMMERCIAL

## 2018-07-19 ENCOUNTER — APPOINTMENT (OUTPATIENT)
Dept: GENERAL RADIOLOGY | Age: 48
End: 2018-07-19
Payer: COMMERCIAL

## 2018-07-19 ENCOUNTER — HOSPITAL ENCOUNTER (EMERGENCY)
Age: 48
Discharge: HOME | End: 2018-07-19
Attending: EMERGENCY MEDICINE
Payer: COMMERCIAL

## 2018-07-19 VITALS
WEIGHT: 185 LBS | HEART RATE: 64 BPM | TEMPERATURE: 97.9 F | SYSTOLIC BLOOD PRESSURE: 135 MMHG | BODY MASS INDEX: 29.73 KG/M2 | RESPIRATION RATE: 15 BRPM | OXYGEN SATURATION: 99 % | HEIGHT: 66 IN | DIASTOLIC BLOOD PRESSURE: 87 MMHG

## 2018-07-19 DIAGNOSIS — R07.9 CHEST PAIN, UNSPECIFIED TYPE: ICD-10-CM

## 2018-07-19 DIAGNOSIS — K76.0 FATTY LIVER: ICD-10-CM

## 2018-07-19 DIAGNOSIS — R10.84 GENERALIZED ABDOMINAL PAIN: Primary | ICD-10-CM

## 2018-07-19 DIAGNOSIS — R10.13 ABDOMINAL PAIN, EPIGASTRIC: ICD-10-CM

## 2018-07-19 LAB
A/G RATIO: 1.4 (ref 1.1–2.2)
ALBUMIN SERPL-MCNC: 3.9 G/DL (ref 3.4–5)
ALP BLD-CCNC: 116 U/L (ref 40–129)
ALT SERPL-CCNC: 20 U/L (ref 10–40)
ANION GAP SERPL CALCULATED.3IONS-SCNC: 11 MMOL/L (ref 3–16)
AST SERPL-CCNC: 17 U/L (ref 15–37)
BASOPHILS ABSOLUTE: 0.1 K/UL (ref 0–0.2)
BASOPHILS RELATIVE PERCENT: 0.8 %
BILIRUB SERPL-MCNC: 0.4 MG/DL (ref 0–1)
BILIRUBIN URINE: NEGATIVE
BLOOD, URINE: NEGATIVE
BUN BLDV-MCNC: 10 MG/DL (ref 7–20)
CALCIUM SERPL-MCNC: 8.9 MG/DL (ref 8.3–10.6)
CHLORIDE BLD-SCNC: 104 MMOL/L (ref 99–110)
CLARITY: CLEAR
CO2: 26 MMOL/L (ref 21–32)
COLOR: NORMAL
CREAT SERPL-MCNC: 0.6 MG/DL (ref 0.6–1.1)
EKG ATRIAL RATE: 61 BPM
EKG ATRIAL RATE: 70 BPM
EKG DIAGNOSIS: NORMAL
EKG DIAGNOSIS: NORMAL
EKG P AXIS: 43 DEGREES
EKG P AXIS: 50 DEGREES
EKG P-R INTERVAL: 170 MS
EKG P-R INTERVAL: 192 MS
EKG Q-T INTERVAL: 400 MS
EKG Q-T INTERVAL: 426 MS
EKG QRS DURATION: 94 MS
EKG QRS DURATION: 96 MS
EKG QTC CALCULATION (BAZETT): 428 MS
EKG QTC CALCULATION (BAZETT): 432 MS
EKG R AXIS: 15 DEGREES
EKG R AXIS: 21 DEGREES
EKG T AXIS: 12 DEGREES
EKG T AXIS: 18 DEGREES
EKG VENTRICULAR RATE: 61 BPM
EKG VENTRICULAR RATE: 70 BPM
EOSINOPHILS ABSOLUTE: 0.2 K/UL (ref 0–0.6)
EOSINOPHILS RELATIVE PERCENT: 3.1 %
GFR AFRICAN AMERICAN: >60
GFR NON-AFRICAN AMERICAN: >60
GLOBULIN: 2.8 G/DL
GLUCOSE BLD-MCNC: 94 MG/DL (ref 70–99)
GLUCOSE URINE: NEGATIVE MG/DL
HCT VFR BLD CALC: 37.9 % (ref 36–48)
HEMOGLOBIN: 13.4 G/DL (ref 12–16)
INR BLD: 1.02 (ref 0.86–1.14)
KETONES, URINE: NEGATIVE MG/DL
LEUKOCYTE ESTERASE, URINE: NEGATIVE
LIPASE: 22 U/L (ref 13–60)
LYMPHOCYTES ABSOLUTE: 1.9 K/UL (ref 1–5.1)
LYMPHOCYTES RELATIVE PERCENT: 30.5 %
MCH RBC QN AUTO: 32.3 PG (ref 26–34)
MCHC RBC AUTO-ENTMCNC: 35.4 G/DL (ref 31–36)
MCV RBC AUTO: 91.3 FL (ref 80–100)
MICROSCOPIC EXAMINATION: NORMAL
MONOCYTES ABSOLUTE: 0.5 K/UL (ref 0–1.3)
MONOCYTES RELATIVE PERCENT: 7.8 %
NEUTROPHILS ABSOLUTE: 3.7 K/UL (ref 1.7–7.7)
NEUTROPHILS RELATIVE PERCENT: 57.8 %
NITRITE, URINE: NEGATIVE
PDW BLD-RTO: 12.7 % (ref 12.4–15.4)
PH UA: 6
PLATELET # BLD: 229 K/UL (ref 135–450)
PMV BLD AUTO: 8.2 FL (ref 5–10.5)
POTASSIUM SERPL-SCNC: 4 MMOL/L (ref 3.5–5.1)
PROTEIN UA: NEGATIVE MG/DL
PROTHROMBIN TIME: 11.6 SEC (ref 9.8–13)
RBC # BLD: 4.15 M/UL (ref 4–5.2)
SODIUM BLD-SCNC: 141 MMOL/L (ref 136–145)
SPECIFIC GRAVITY UA: 1.01
TOTAL PROTEIN: 6.7 G/DL (ref 6.4–8.2)
TROPONIN: <0.01 NG/ML
TROPONIN: <0.01 NG/ML
URINE TYPE: NORMAL
UROBILINOGEN, URINE: 0.2 E.U./DL
WBC # BLD: 6.3 K/UL (ref 4–11)

## 2018-07-19 PROCEDURE — 99285 EMERGENCY DEPT VISIT HI MDM: CPT

## 2018-07-19 PROCEDURE — 85025 COMPLETE CBC W/AUTO DIFF WBC: CPT

## 2018-07-19 PROCEDURE — 2580000003 HC RX 258: Performed by: PHYSICIAN ASSISTANT

## 2018-07-19 PROCEDURE — 93005 ELECTROCARDIOGRAM TRACING: CPT | Performed by: EMERGENCY MEDICINE

## 2018-07-19 PROCEDURE — 6360000002 HC RX W HCPCS: Performed by: PHYSICIAN ASSISTANT

## 2018-07-19 PROCEDURE — 74177 CT ABD & PELVIS W/CONTRAST: CPT

## 2018-07-19 PROCEDURE — 93005 ELECTROCARDIOGRAM TRACING: CPT | Performed by: PHYSICIAN ASSISTANT

## 2018-07-19 PROCEDURE — 93010 ELECTROCARDIOGRAM REPORT: CPT | Performed by: INTERNAL MEDICINE

## 2018-07-19 PROCEDURE — 71046 X-RAY EXAM CHEST 2 VIEWS: CPT

## 2018-07-19 PROCEDURE — 85610 PROTHROMBIN TIME: CPT

## 2018-07-19 PROCEDURE — 6370000000 HC RX 637 (ALT 250 FOR IP): Performed by: EMERGENCY MEDICINE

## 2018-07-19 PROCEDURE — 84484 ASSAY OF TROPONIN QUANT: CPT

## 2018-07-19 PROCEDURE — 96374 THER/PROPH/DIAG INJ IV PUSH: CPT

## 2018-07-19 PROCEDURE — 80053 COMPREHEN METABOLIC PANEL: CPT

## 2018-07-19 PROCEDURE — 83690 ASSAY OF LIPASE: CPT

## 2018-07-19 PROCEDURE — 81003 URINALYSIS AUTO W/O SCOPE: CPT

## 2018-07-19 PROCEDURE — 6360000004 HC RX CONTRAST MEDICATION: Performed by: EMERGENCY MEDICINE

## 2018-07-19 PROCEDURE — 96361 HYDRATE IV INFUSION ADD-ON: CPT

## 2018-07-19 RX ORDER — ONDANSETRON 2 MG/ML
4 INJECTION INTRAMUSCULAR; INTRAVENOUS ONCE
Status: COMPLETED | OUTPATIENT
Start: 2018-07-19 | End: 2018-07-19

## 2018-07-19 RX ORDER — 0.9 % SODIUM CHLORIDE 0.9 %
1000 INTRAVENOUS SOLUTION INTRAVENOUS ONCE
Status: COMPLETED | OUTPATIENT
Start: 2018-07-19 | End: 2018-07-19

## 2018-07-19 RX ORDER — ONDANSETRON 4 MG/1
4 TABLET, FILM COATED ORAL EVERY 8 HOURS PRN
Qty: 15 TABLET | Refills: 0 | Status: ON HOLD | OUTPATIENT
Start: 2018-07-19 | End: 2018-08-01

## 2018-07-19 RX ORDER — SUCRALFATE ORAL 1 G/10ML
1 SUSPENSION ORAL 4 TIMES DAILY
Qty: 1200 ML | Refills: 0 | Status: ON HOLD | OUTPATIENT
Start: 2018-07-19 | End: 2018-08-01

## 2018-07-19 RX ADMIN — ONDANSETRON 4 MG: 2 SOLUTION INTRAMUSCULAR; INTRAVENOUS at 12:53

## 2018-07-19 RX ADMIN — IOPAMIDOL 100 ML: 755 INJECTION, SOLUTION INTRAVENOUS at 14:56

## 2018-07-19 RX ADMIN — LIDOCAINE HYDROCHLORIDE: 20 SOLUTION ORAL; TOPICAL at 15:57

## 2018-07-19 RX ADMIN — SODIUM CHLORIDE 1000 ML: 9 INJECTION, SOLUTION INTRAVENOUS at 14:40

## 2018-07-19 ASSESSMENT — PAIN SCALES - GENERAL: PAINLEVEL_OUTOF10: 6

## 2018-07-19 NOTE — ED NOTES
Discharge instructions reviewed with Pt. Pt verbalizes understanding at this time. Prescriptions/medications reviewed with pt at this time. VS WNL. Pt condition stable at this time. No concerns voiced.       Haleigh Koroma RN  07/19/18 2941

## 2018-07-19 NOTE — ED PROVIDER NOTES
Breathing is unlabored. Speaking comfortably in full sentences. Lungs clear to auscultation bilaterally. No wheezing, rales or rhonchi noted. Abdomen: Nondistended, soft, non-rigid, generalized tenderness to palpation over the epigastric and periumbilical region of the abdomen, no guarding  Bowel sounds present. Negative Boss sign. Negative CVA tenderness. EXTREMITIES: MAEE. No acute deformities, crepitus or step-offs noted. Soft compartments. Nontender palpation of the posterior calves. SKIN: Warm and dry. No rashes noted. NEUROLOGICAL: Alert and oriented. Strength is 5/5 in all extremities and sensation is intact. DIAGNOSTIC RESULTS   LABS:    Labs Reviewed   URINALYSIS    Narrative:     Performed at:  [address] [place] [address] [employer]   Phone [phone]   LIPASE    Narrative:     Performed at:  [address] [place] [address] [employer]   Phone [phone]   CBC WITH AUTO DIFFERENTIAL    Narrative:     Performed at:  [address] [place] [address] [employer]   Phone [phone]   COMPREHENSIVE METABOLIC PANEL    Narrative:     Performed at:  [address] [place] [address] [employer]   Phone [phone]   TROPONIN    Narrative:     Performed at:  [address] [place] [address] [employer]   Phone [phone]   PROTIME-INR    Narrative:     Performed at:  [address] [place] [address] [employer]   Phone [phone]   TROPONIN    Narrative:     Performed at:  [address] [place] [address] [employer]   Phone [phone]       All other labs were within normal range or not returned as of this dictation. EKG:  All EKG's are interpreted by the Emergency Department Physician who either (XYLOCAINE) 5 mL (GI COCKTAIL) ( Oral Given 7/19/18 5490)       HEART SCORE:    History: +1 for moderate suspicion  EKG: +0 for normal EKG   Age: +1 for age 44-72 years  Risk factors (includes HLD, HTN, DM, tobacco use, obesity, and +FHx): +1 for 1-2 risk factors  Initial troponin: +0 for negative troponin    Heart score: 3. This falls under the following category: Score of 0-3, which indicates a very low risk for major adverse cardiac event and supports early discharge    The patient was also seen and evaluated by my attending, Dr. Layla Masterson. We discussed the history, physical, labs and imaging as well as the emergency department course. Please see their notes for further details. The patient was evaluated in the emergency department for abdominal pain. Patient states she developed vomiting yesterday and has noted radiation of her abdominal pain into her chest.  Patient states she has a history of irregular heart rate and has a family history of heart attacks. Patient has any heart attacks herself. Patient states she was concerned for bowel obstruction because she has had one in the past that she come to emergency department. The exam, patient mild tenderness over the epigastric and umbilical region, no guarding, bowel sounds present. Patient also states she had shortness breath that resolved yesterday. The patient was negative for PERC criteria, therefore low risk for pulmonary embolism at this time. The patient denied any hemoptysis, unilateral leg swelling, recent surgery or trauma, history of blood clots, or hormone use. Initial troponin and delta troponin less than 0.01. Urinalysis negative for leuk trase and nitrates. Lipase 22. CBC obtained and unremarkable. CMP obtained and unremarkable. PT 11.6, INR 1.02.  CT of the abdomen, showed no acute intra-abdominal abnormality or examination for pain, hepatic steatosis. EKG is obtained, see results below. Chest x-ray shows no acute findings.   Patient 3. 7 1.7 - 7.7 K/uL    Lymphocytes # 1.9 1.0 - 5.1 K/uL    Monocytes # 0.5 0.0 - 1.3 K/uL    Eosinophils # 0.2 0.0 - 0.6 K/uL    Basophils # 0.1 0.0 - 0.2 K/uL   Comprehensive Metabolic Panel   Result Value Ref Range    Sodium 141 136 - 145 mmol/L    Potassium 4.0 3.5 - 5.1 mmol/L    Chloride 104 99 - 110 mmol/L    CO2 26 21 - 32 mmol/L    Anion Gap 11 3 - 16    Glucose 94 70 - 99 mg/dL    BUN 10 7 - 20 mg/dL    CREATININE 0.6 0.6 - 1.1 mg/dL    GFR Non-African American >60 >60    GFR African American >60 >60    Calcium 8.9 8.3 - 10.6 mg/dL    Total Protein 6.7 6.4 - 8.2 g/dL    Alb 3.9 3.4 - 5.0 g/dL    Albumin/Globulin Ratio 1.4 1.1 - 2.2    Total Bilirubin 0.4 0.0 - 1.0 mg/dL    Alkaline Phosphatase 116 40 - 129 U/L    ALT 20 10 - 40 U/L    AST 17 15 - 37 U/L    Globulin 2.8 g/dL   Troponin   Result Value Ref Range    Troponin <0.01 <0.01 ng/mL   Protime-INR   Result Value Ref Range    Protime 11.6 9.8 - 13.0 sec    INR 1.02 0.86 - 1.14   Troponin   Result Value Ref Range    Troponin <0.01 <0.01 ng/mL   EKG 12 Lead   Result Value Ref Range    Ventricular Rate 70 BPM    Atrial Rate 70 BPM    P-R Interval 170 ms    QRS Duration 96 ms    Q-T Interval 400 ms    QTc Calculation (Bazett) 432 ms    P Axis 50 degrees    R Axis 21 degrees    T Axis 18 degrees    Diagnosis       Normal sinus rhythmRSR' or QR pattern in V1 suggests right ventricular conduction delayBorderline ECGWhen compared with ECG of 17-JAN-2018 07:46,No significant change was foundConfirmed by Marquise Ybarra MD, KLEBER ([id number]) on 7/19/2018 8:02:17 PM   EKG 12 Lead   Result Value Ref Range    Ventricular Rate 61 BPM    Atrial Rate 61 BPM    P-R Interval 192 ms    QRS Duration 94 ms    Q-T Interval 426 ms    QTc Calculation (Bazett) 428 ms    P Axis 43 degrees    R Axis 15 degrees    T Axis 12 degrees    Diagnosis       Normal sinus rhythmRSR' or QR pattern in V1 suggests right ventricular conduction delayBorderline ECGWhen compared with ECG of 19-JUL-2018 11:52,No 7/19/2018  5:21 PM                 (Please note that portions of this note were completed with a voice recognition program.  Efforts were made to edit the dictations but occasionally words are mis-transcribed.)             Cris Rodriguez PA-C  07/19/18 2010

## 2018-07-20 NOTE — ED PROVIDER NOTES
Emergency Department Provider Note     Location: [address] [place] [address]  ED  7/19/2018    I independently performed a history and physical on Cheko Moreira. All diagnostic, treatment, and disposition decisions were made by myself in conjunction with the mid-level provider. Briefly, this is a 50 y.o. female here for chest pain, L sided, started yest afternoon around same time upper abd pain, after eating chicken salad, nausea, no vomiting, SOB, had hernia surgery earlier this year, no dizziness/syncope, fam hx of heart dx. No hx DVT/PE. On nexium for GERD. Reports irreg. Heart beat in past, but no diagnosed dysrhythmias. Hx of hudson. Hx of abd problems in the past, multiple scopes, multiple ex laps for adhesions, bowel perf in past. S/p total hyst.    Vitals:    07/19/18 1255 07/19/18 1404 07/19/18 1557 07/19/18 1711   BP: (!) 141/93 138/85 128/81 135/87   Pulse: 64 64 64 64   Resp: 16 16 14 15   Temp:       TempSrc:       SpO2: 98% 98% 99% 99%   Weight:       Height:         Exam showed mild epigastric tenderness, no rigidity/guarding, no peritoneal signs, no chest wall tenderness, heart RRR, no MGR, lungs CTAB, resp. Nonlabored, no edema, no calf tenderness, A&Ox4. EKG 1152 interpreted by me shows:  normal sinus rhythm with a rate of 70  Axis is   Normal  QTc is  within an acceptable range  Intervals and Durations are unremarkable. RsR prime in V1, possibly RVCD     ST Segments: normal  No significant change from prior EKG dated 1-17-18  Rpt at 1611- R 61, similar to prior, no new ischemic change    HEART SCORE:    History: +1 for moderate suspicion  EKG: +0 for normal EKG   Age: +1 for age 44-72 years  Risk factors (includes HLD, HTN, DM, tobacco use, obesity, and +FHx): +1 for 1-2 risk factors  Initial troponin: +0 for negative troponin    Heart score: 3.   This falls under the following category: Score of 0-3, which indicates a very low risk for major adverse cardiac event and supports early

## 2018-07-31 ENCOUNTER — ANESTHESIA EVENT (OUTPATIENT)
Dept: ENDOSCOPY | Age: 48
End: 2018-07-31
Payer: COMMERCIAL

## 2018-07-31 ENCOUNTER — TELEPHONE (OUTPATIENT)
Dept: GASTROENTEROLOGY | Age: 48
End: 2018-07-31

## 2018-07-31 NOTE — ANESTHESIA PRE PROCEDURE
polyps Z86.010    Dysphagia R13.10    Gastroesophageal reflux disease K21.9    Lower esophageal ring J37.7    Helicobacter positive gastritis K29.70, B96.81    Abnormal liver enzymes R74.8    Fatty liver disease, nonalcoholic C49.2     Past Medical History:     Anxiety     Arthritis     Fibromyalgia     Hyperlipidemia     No medications    Hypertension     Incisional hernia     Irritable bowel syndrome      Past Surgical History:     ABDOMEN SURGERY  02/2016    ruptured bowel    ABDOMINAL EXPLORATION SURGERY N/A 02/24/2017    explor lap possible lysis of adhsions possible bowel resection    APPENDECTOMY      CHOLECYSTECTOMY, LAPAROSCOPIC N/A 3/28/13    LYSIS OF ADHESIONS    COLONOSCOPY      unsure of the date/ polyps removed    COLONOSCOPY  05/30/2018    Poor prep, procedure aborted.     CYSTOSCOPY  9/11/12    lyssa stents placed for surgery and removed    HERNIA REPAIR  01/17/2018    DAVINCI INCISIONAL HERNIA REPAIR WITH MESH    HYSTERECTOMY      PELVIC LAPAROSCOPY  7/3/12    ROTATOR CUFF REPAIR      SALPINGO-OOPHORECTOMY  9/11/12    BILATERAL SALPINGO-OOPHORECTOMY; LYSIS OF ADHESIONS;;RETROPERITONEAL RESECTION DISTAL URETERS    SHOULDER ARTHROSCOPY Left 7/8/14    TUBAL LIGATION      UPPER GASTROINTESTINAL ENDOSCOPY  05/30/2018    Mild distal esophageal ring- dilated with 15 mm, gastritis     Social History:     Smoking status: Former Smoker     Packs/day: 0.50     Years: 20.00     Types: Cigarettes     Quit date: 3/20/2018    Smokeless tobacco: Never Used    Alcohol use No                                Counseling given: Not Answered    Vital Signs (Current):  BP: 101/73  Pulse: 80 Resp: 17  SpO2: 100 Temp: 97.5 °F (36.4 °C)    NPO Status:>[place]    CBC:    WBC 6.3 07/19/2018    RBC 4.15 07/19/2018    HGB 13.4 07/19/2018    HCT 37.9 07/19/2018     07/19/2018     CMP:     07/19/2018    K 4.0 07/19/2018     07/19/2018    CO2 26 07/19/2018    BUN 10 07/19/2018    CREATININE 0.6 07/19/2018    GLUCOSE 94 07/19/2018    PROT 6.7 07/19/2018    CALCIUM 8.9 07/19/2018    BILITOT 0.4 07/19/2018    ALKPHOS 116 07/19/2018    AST 17 07/19/2018    ALT 20 07/19/2018     Coags:    PROTIME 11.6 07/19/2018    INR 1.02 07/19/2018    APTT 32.6 02/19/2013     Anesthesia Evaluation  Patient summary reviewed and Nursing notes reviewed  Airway: Mallampati: II  TM distance: >3 FB   Neck ROM: full  Mouth opening: > = 3 FB Dental:          Pulmonary:       (-) COPD, asthma and sleep apnea                           Cardiovascular:  Exercise tolerance: good (>4 METS),   (+) hypertension:,     (-) past MI, CAD, CABG/stent and  FANG                Neuro/Psych:      (-) seizures, TIA and CVA           GI/Hepatic/Renal:   (+) GERD: well controlled,      (-) no renal disease       Endo/Other:        (-) diabetes mellitus, hypothyroidism, hyperthyroidism, arthritis               Abdominal:           Vascular:     - DVT and PE. Anesthesia Plan      TIVA     ASA 2       Induction: intravenous. Anesthetic plan and risks discussed with patient. Plan discussed with CRNA.           Cherise Darling MD   7/31/2018

## 2018-08-01 ENCOUNTER — ANESTHESIA (OUTPATIENT)
Dept: ENDOSCOPY | Age: 48
End: 2018-08-01
Payer: COMMERCIAL

## 2018-08-01 ENCOUNTER — HOSPITAL ENCOUNTER (OUTPATIENT)
Age: 48
Setting detail: OUTPATIENT SURGERY
Discharge: HOME | End: 2018-08-01
Attending: INTERNAL MEDICINE | Admitting: INTERNAL MEDICINE
Payer: COMMERCIAL

## 2018-08-01 VITALS
SYSTOLIC BLOOD PRESSURE: 115 MMHG | DIASTOLIC BLOOD PRESSURE: 72 MMHG | HEART RATE: 73 BPM | TEMPERATURE: 96.7 F | RESPIRATION RATE: 16 BRPM | OXYGEN SATURATION: 97 %

## 2018-08-01 VITALS — OXYGEN SATURATION: 98 % | DIASTOLIC BLOOD PRESSURE: 71 MMHG | SYSTOLIC BLOOD PRESSURE: 107 MMHG

## 2018-08-01 PROBLEM — K22.2 LOWER ESOPHAGEAL RING: Status: RESOLVED | Noted: 2018-05-30 | Resolved: 2018-08-01

## 2018-08-01 PROBLEM — R13.10 DYSPHAGIA: Status: RESOLVED | Noted: 2018-05-16 | Resolved: 2018-08-01

## 2018-08-01 PROCEDURE — 3700000000 HC ANESTHESIA ATTENDED CARE: Performed by: INTERNAL MEDICINE

## 2018-08-01 PROCEDURE — 7100000011 HC PHASE II RECOVERY - ADDTL 15 MIN: Performed by: INTERNAL MEDICINE

## 2018-08-01 PROCEDURE — 2709999900 HC NON-CHARGEABLE SUPPLY: Performed by: INTERNAL MEDICINE

## 2018-08-01 PROCEDURE — 3609012400 HC EGD TRANSORAL BIOPSY SINGLE/MULTIPLE: Performed by: INTERNAL MEDICINE

## 2018-08-01 PROCEDURE — 88305 TISSUE EXAM BY PATHOLOGIST: CPT

## 2018-08-01 PROCEDURE — 2580000003 HC RX 258: Performed by: NURSE ANESTHETIST, CERTIFIED REGISTERED

## 2018-08-01 PROCEDURE — 43239 EGD BIOPSY SINGLE/MULTIPLE: CPT | Performed by: INTERNAL MEDICINE

## 2018-08-01 PROCEDURE — 3700000001 HC ADD 15 MINUTES (ANESTHESIA): Performed by: INTERNAL MEDICINE

## 2018-08-01 PROCEDURE — 6360000002 HC RX W HCPCS: Performed by: NURSE ANESTHETIST, CERTIFIED REGISTERED

## 2018-08-01 PROCEDURE — 7100000010 HC PHASE II RECOVERY - FIRST 15 MIN: Performed by: INTERNAL MEDICINE

## 2018-08-01 PROCEDURE — 45378 DIAGNOSTIC COLONOSCOPY: CPT | Performed by: INTERNAL MEDICINE

## 2018-08-01 PROCEDURE — 3609027000 HC COLONOSCOPY: Performed by: INTERNAL MEDICINE

## 2018-08-01 RX ORDER — SODIUM CHLORIDE 9 MG/ML
INJECTION, SOLUTION INTRAVENOUS CONTINUOUS
Status: CANCELLED | OUTPATIENT
Start: 2018-08-01

## 2018-08-01 RX ORDER — SODIUM CHLORIDE 9 MG/ML
INJECTION, SOLUTION INTRAVENOUS CONTINUOUS PRN
Status: DISCONTINUED | OUTPATIENT
Start: 2018-08-01 | End: 2018-08-01 | Stop reason: SDUPTHER

## 2018-08-01 RX ORDER — PROPOFOL 10 MG/ML
INJECTION, EMULSION INTRAVENOUS PRN
Status: DISCONTINUED | OUTPATIENT
Start: 2018-08-01 | End: 2018-08-01 | Stop reason: SDUPTHER

## 2018-08-01 RX ADMIN — PROPOFOL 550 MG: 10 INJECTION, EMULSION INTRAVENOUS at 11:29

## 2018-08-01 RX ADMIN — SODIUM CHLORIDE: 900 INJECTION INTRAVENOUS at 10:55

## 2018-08-01 NOTE — H&P
Chief Complaint   Patient presents with    Follow-up       after colon/EGD         HPI      Keyonna Edgar is a 52 y.o. female who presents for followup for multiple GI symptoms including GERD, dysphagia, alternating constipation/diarrhea. Extensive surgical history as listed below. Patient has a history of a exploratory laparotomy for SBO 2/24/17 with lysis of adhesions and resection of a Meckels diverticulum with primary anastomosis. She then had an incisional hernia that was repaired 1/17/18 with mesh by Dr Lorraine Rhodes.     Dysphagia/GERD - Since the last visit she had a EGD 5/30/18 with a mild distal esophageal ring that was dilated, mild gastritis. Since the dilation her dysphagia is much improved and symptoms are minimal currently. She has intermittent GERD.      H pylori - Biopsies of the stomach were positive for significant H pylori gastritis. There is mention of an atypical lymphoid infiltrate on bx. \"majority of the inflammatory infiltrate of the lamina propria shows a typical pattern for H. pylori infection, but some portions of the lymphoid infiltrate appear to be focally destructive to the surrounding gastric glands.  Immunohistochemical and in-situ hybridization stains show no definitive evidence for lymphoma and the findings are likely those of a reactive lymphoid population. \"  She was given Bismuth based quadruple therapy and she finished this last week.     Alternating diarrhea and constipation - states she continues to have this but mostly loose stools now, 3 a day on average, denies bleeding. Generally goes after she eats. Has not tried a low FODMAP diet.     Colonoscopy 5/30/18 attempted, aborted due to poor prep. Patient states she was only able to keep down half of the Golytely. Will need to try a different prep.     Mildly elevated liver enzymes 2/2017, normal bilirubin and albumin. CT abdomen showed hepatic steatosis.         PAST MEDICAL HISTORY      Past Medical History        Past Medical

## 2018-08-01 NOTE — OP NOTE
Mansi Dean    [address] [place] [address] ,  [facility]  Phone: [phone]    Colonoscopy and EGD Procedure Note    Patient: Cely Zarate  : 1970    Procedure: Colonoscopy and EGD    Date:  2018     Endoscopist:  Hossein Varela MD    Referring Physician:  Vivek CHACON    Preoperative Diagnosis:  H. PYLORI    Postoperative Diagnosis:  See impression    Anesthesia: Anesthesia: MAC  Sedation: see anesthesia notes for details. Start Time: EGD start 11:02, EGD end: 11:11, colonoscopy start: 11:15, colonoscopy end: 11:29  Colonoscopy withdrawal time: 6 minutes  ASA Class: 3  Mallampati: II (soft palate, uvula, fauces visible)    Indications: This is a 50y.o. year old female who presents today for EGD and colonoscopy. H pylori gastritis - finished quadruple Bismuth based therapy last week. She had an atypical lymphoid infiltrate (thought by pathology to be likely reactive to H pylori gastritis) on recent EGD 18 on gastric biopsies. She has now been treated with quadruple therapy for H pylori and due to the path findings we will plan a repeat EGD to rebiopsy and reassess.      Colonoscopy for surveillance for personal history of polyps - poor prep on last exam 18 as she could not tolerate Golytely and threw half of it up. She did a 2 day prep (magnesium citrate day 1 and Prepopik day 2).         EGD    Procedure Details  Informed consent was obtained for the procedure, including conscious sedation. Risks of pancreatitis, infection, perforation, hemorrhage, adverse drug reaction and aspiration were discussed. The patient was placed in the left lateral decubitus position. Based on the pre-procedure assessment, including review of the patient's medical history, medications, allergies, and review of systems, she had been deemed to be an appropriate candidate for conscious sedation; she was therefore sedated with the medications listed above.

## 2018-08-02 ENCOUNTER — TELEPHONE (OUTPATIENT)
Dept: GASTROENTEROLOGY | Age: 48
End: 2018-08-02

## 2018-08-02 NOTE — TELEPHONE ENCOUNTER
----- Message from Hossein Varela MD sent at 8/1/2018 11:37 AM EDT -----  followup in office 2 months

## 2018-08-09 ENCOUNTER — HOSPITAL ENCOUNTER (EMERGENCY)
Age: 48
Discharge: HOME | End: 2018-08-09
Attending: EMERGENCY MEDICINE
Payer: COMMERCIAL

## 2018-08-09 VITALS
SYSTOLIC BLOOD PRESSURE: 110 MMHG | WEIGHT: 185 LBS | HEIGHT: 66 IN | OXYGEN SATURATION: 96 % | HEART RATE: 76 BPM | TEMPERATURE: 97.9 F | DIASTOLIC BLOOD PRESSURE: 73 MMHG | BODY MASS INDEX: 29.73 KG/M2 | RESPIRATION RATE: 16 BRPM

## 2018-08-09 DIAGNOSIS — R11.0 NAUSEA: ICD-10-CM

## 2018-08-09 DIAGNOSIS — R10.9 ABDOMINAL PAIN, UNSPECIFIED ABDOMINAL LOCATION: Primary | ICD-10-CM

## 2018-08-09 LAB
A/G RATIO: 1.6 (ref 1.1–2.2)
ALBUMIN SERPL-MCNC: 3.9 G/DL (ref 3.4–5)
ALP BLD-CCNC: 101 U/L (ref 40–129)
ALT SERPL-CCNC: 23 U/L (ref 10–40)
ANION GAP SERPL CALCULATED.3IONS-SCNC: 10 MMOL/L (ref 3–16)
AST SERPL-CCNC: 17 U/L (ref 15–37)
BASOPHILS ABSOLUTE: 0.1 K/UL (ref 0–0.2)
BASOPHILS RELATIVE PERCENT: 0.9 %
BILIRUB SERPL-MCNC: 0.4 MG/DL (ref 0–1)
BUN BLDV-MCNC: 15 MG/DL (ref 7–20)
CALCIUM SERPL-MCNC: 8.6 MG/DL (ref 8.3–10.6)
CHLORIDE BLD-SCNC: 108 MMOL/L (ref 99–110)
CO2: 26 MMOL/L (ref 21–32)
CREAT SERPL-MCNC: 0.6 MG/DL (ref 0.6–1.1)
EOSINOPHILS ABSOLUTE: 0.2 K/UL (ref 0–0.6)
EOSINOPHILS RELATIVE PERCENT: 2.4 %
GFR AFRICAN AMERICAN: >60
GFR NON-AFRICAN AMERICAN: >60
GLOBULIN: 2.4 G/DL
GLUCOSE BLD-MCNC: 107 MG/DL (ref 70–99)
HCT VFR BLD CALC: 39.9 % (ref 36–48)
HEMOGLOBIN: 13.6 G/DL (ref 12–16)
LIPASE: 36 U/L (ref 13–60)
LYMPHOCYTES ABSOLUTE: 1.6 K/UL (ref 1–5.1)
LYMPHOCYTES RELATIVE PERCENT: 20.7 %
MCH RBC QN AUTO: 31.6 PG (ref 26–34)
MCHC RBC AUTO-ENTMCNC: 34.2 G/DL (ref 31–36)
MCV RBC AUTO: 92.4 FL (ref 80–100)
MONOCYTES ABSOLUTE: 0.6 K/UL (ref 0–1.3)
MONOCYTES RELATIVE PERCENT: 7.4 %
NEUTROPHILS ABSOLUTE: 5.3 K/UL (ref 1.7–7.7)
NEUTROPHILS RELATIVE PERCENT: 68.6 %
PDW BLD-RTO: 13 % (ref 12.4–15.4)
PLATELET # BLD: 231 K/UL (ref 135–450)
PMV BLD AUTO: 8.4 FL (ref 5–10.5)
POTASSIUM REFLEX MAGNESIUM: 3.9 MMOL/L (ref 3.5–5.1)
RBC # BLD: 4.31 M/UL (ref 4–5.2)
SODIUM BLD-SCNC: 144 MMOL/L (ref 136–145)
SPECIMEN STATUS: NORMAL
TOTAL PROTEIN: 6.3 G/DL (ref 6.4–8.2)
WBC # BLD: 7.8 K/UL (ref 4–11)

## 2018-08-09 PROCEDURE — 96374 THER/PROPH/DIAG INJ IV PUSH: CPT

## 2018-08-09 PROCEDURE — 6360000002 HC RX W HCPCS: Performed by: EMERGENCY MEDICINE

## 2018-08-09 PROCEDURE — 99284 EMERGENCY DEPT VISIT MOD MDM: CPT

## 2018-08-09 PROCEDURE — 85025 COMPLETE CBC W/AUTO DIFF WBC: CPT

## 2018-08-09 PROCEDURE — 80053 COMPREHEN METABOLIC PANEL: CPT

## 2018-08-09 PROCEDURE — 83690 ASSAY OF LIPASE: CPT

## 2018-08-09 RX ORDER — ONDANSETRON 2 MG/ML
4 INJECTION INTRAMUSCULAR; INTRAVENOUS ONCE
Status: COMPLETED | OUTPATIENT
Start: 2018-08-09 | End: 2018-08-09

## 2018-08-09 RX ORDER — GABAPENTIN 300 MG/1
300 CAPSULE ORAL 3 TIMES DAILY PRN
COMMUNITY
Start: 2018-07-31 | End: 2018-10-29

## 2018-08-09 RX ADMIN — ONDANSETRON 4 MG: 2 INJECTION INTRAMUSCULAR; INTRAVENOUS at 06:32

## 2018-08-09 ASSESSMENT — PAIN SCALES - GENERAL: PAINLEVEL_OUTOF10: 3

## 2018-08-09 ASSESSMENT — PAIN DESCRIPTION - PAIN TYPE: TYPE: ACUTE PAIN

## 2018-08-09 ASSESSMENT — PAIN DESCRIPTION - ONSET: ONSET: AWAKENED FROM SLEEP

## 2018-08-09 ASSESSMENT — PAIN DESCRIPTION - LOCATION: LOCATION: ABDOMEN

## 2018-08-09 ASSESSMENT — PAIN DESCRIPTION - DESCRIPTORS: DESCRIPTORS: CRAMPING;BURNING

## 2018-08-09 ASSESSMENT — PAIN DESCRIPTION - PROGRESSION: CLINICAL_PROGRESSION: NOT CHANGED

## 2018-08-09 ASSESSMENT — PAIN DESCRIPTION - FREQUENCY: FREQUENCY: CONTINUOUS

## 2018-08-09 NOTE — ED PROVIDER NOTES
Emergency [address] [place]  ED    Patient: Crow Spence  MRN: [id number]  : 1970  Date of Evaluation: 2018  ED Provider: Oscar Canela DO    Chief Complaint       Chief Complaint   Patient presents with    Abdominal Pain     Pt arrives via EMS for report of abdominal pain. CPTA 4mg zofran PO. Dung Cotter is a 50 y.o. female who presents to the emergency department For chief complaint of abdominal pain. Patient states that early this morning she woke from sleep with epigastric cramping abdominal pain. It caused her to double over and while she was extremely nauseous did not actually vomit. It persisted for approximately 30-45 minutes and so she had her  call EMS. By the time EMS arrived her symptoms had completely resolved but they brought her here anyway for further evaluation. Otherwise denies any other significant symptoms such as fever, headache, cough, shortness breath, chest pain, changes in bowel movements or urinary symptoms, vaginal bleeding or discharge. ROS:     Review of Systems   All other systems reviewed and are negative. Past History     Past Medical History:   Diagnosis Date    Anxiety     Arthritis     Fibromyalgia     Hyperlipidemia     No medications    Hypertension     Incisional hernia     Irritable bowel syndrome      Past Surgical History:   Procedure Laterality Date    ABDOMEN SURGERY  2016    ruptured bowel    ABDOMINAL ADHESION SURGERY      ABDOMINAL EXPLORATION SURGERY N/A 2017    explor lap possible lysis of adhsions possible bowel resection    APPENDECTOMY      CHOLECYSTECTOMY      CHOLECYSTECTOMY, LAPAROSCOPIC N/A 3/28/13    LYSIS OF ADHESIONS    COLONOSCOPY      unsure of the date/ polyps removed    COLONOSCOPY  2018    Poor prep, procedure aborted.     CYSTOSCOPY  12    lyssa stents placed for surgery and removed    HERNIA REPAIR  2018    [place] kg)     Physical Exam   Constitutional: She is oriented to person, place, and time. She appears well-developed and well-nourished. No distress. Patient is sitting up in bed, talking normally and appropriate. She does not appear to be in acute discomfort or distress. HENT:   Head: Normocephalic and atraumatic. Mouth/Throat: Oropharynx is clear and moist.   Eyes: EOM are normal. Pupils are equal, round, and reactive to light. Right eye exhibits no discharge. Left eye exhibits no discharge. Neck: Normal range of motion. Neck supple. No tracheal deviation present. Cardiovascular: Normal rate, regular rhythm, normal heart sounds and intact distal pulses. Exam reveals no gallop and no friction rub. No murmur heard. Pulmonary/Chest: Effort normal and breath sounds normal. No stridor. No respiratory distress. She has no wheezes. She has no rales. She exhibits no tenderness. Abdominal: Soft. She exhibits no distension. There is no tenderness. There is no rebound and no guarding. Musculoskeletal: Normal range of motion. She exhibits no edema, tenderness or deformity. Neurological: She is alert and oriented to person, place, and time. No cranial nerve deficit. Coordination normal.   Skin: Skin is warm and dry. No rash noted. She is not diaphoretic. No erythema. No pallor. Psychiatric: She has a normal mood and affect. Nursing note and vitals reviewed.       Diagnostics   Labs:  Results for orders placed or performed during the hospital encounter of 08/09/18   CBC auto differential   Result Value Ref Range    WBC 7.8 4.0 - 11.0 K/uL    RBC 4.31 4.00 - 5.20 M/uL    Hemoglobin 13.6 12.0 - 16.0 g/dL    Hematocrit 39.9 36.0 - 48.0 %    MCV 92.4 80.0 - 100.0 fL    MCH 31.6 26.0 - 34.0 pg    MCHC 34.2 31.0 - 36.0 g/dL    RDW 13.0 12.4 - 15.4 %    Platelets 290 752 - 524 K/uL    MPV 8.4 5.0 - 10.5 fL    Neutrophils % 68.6 %    Lymphocytes % 20.7 %    Monocytes % 7.4 %    Eosinophils % 2.4 %    Basophils % 0.9

## 2018-08-09 NOTE — ED NOTES
Pt and family asking about results and pt states that her nausea is much better and she went to bathroom and did not give a urine specimen and cannot at this time. Will make MD aware.      Casi Cheng RN  08/09/18 [id number]

## 2018-08-24 ENCOUNTER — OFFICE VISIT (OUTPATIENT)
Dept: GASTROENTEROLOGY | Age: 48
End: 2018-08-24

## 2018-08-24 VITALS
HEIGHT: 66 IN | BODY MASS INDEX: 29.57 KG/M2 | DIASTOLIC BLOOD PRESSURE: 84 MMHG | SYSTOLIC BLOOD PRESSURE: 128 MMHG | HEART RATE: 80 BPM | WEIGHT: 184 LBS | RESPIRATION RATE: 16 BRPM

## 2018-08-24 DIAGNOSIS — K29.70 HELICOBACTER POSITIVE GASTRITIS: ICD-10-CM

## 2018-08-24 DIAGNOSIS — K58.2 IRRITABLE BOWEL SYNDROME WITH BOTH CONSTIPATION AND DIARRHEA: Primary | ICD-10-CM

## 2018-08-24 DIAGNOSIS — R11.0 NAUSEA: ICD-10-CM

## 2018-08-24 DIAGNOSIS — K21.9 GASTROESOPHAGEAL REFLUX DISEASE WITHOUT ESOPHAGITIS: ICD-10-CM

## 2018-08-24 DIAGNOSIS — K76.0 FATTY LIVER DISEASE, NONALCOHOLIC: ICD-10-CM

## 2018-08-24 DIAGNOSIS — B96.81 HELICOBACTER POSITIVE GASTRITIS: ICD-10-CM

## 2018-08-24 PROCEDURE — 99213 OFFICE O/P EST LOW 20 MIN: CPT | Performed by: INTERNAL MEDICINE

## 2018-08-24 RX ORDER — MECLIZINE HYDROCHLORIDE 25 MG/1
25 TABLET ORAL
COMMUNITY
Start: 2018-06-28 | End: 2021-11-11

## 2018-08-24 RX ORDER — MECLIZINE HYDROCHLORIDE 25 MG/1
TABLET ORAL
Refills: 3 | COMMUNITY
Start: 2018-06-28 | End: 2018-08-24 | Stop reason: SDUPTHER

## 2018-08-24 RX ORDER — METRONIDAZOLE 250 MG/1
250 TABLET ORAL 4 TIMES DAILY
Qty: 56 TABLET | Refills: 0 | Status: SHIPPED | OUTPATIENT
Start: 2018-08-24 | End: 2018-09-07

## 2018-08-24 RX ORDER — ERGOCALCIFEROL 1.25 MG/1
CAPSULE ORAL
Refills: 3 | COMMUNITY
Start: 2018-07-23

## 2018-08-24 RX ORDER — LEVOFLOXACIN 500 MG/1
500 TABLET, FILM COATED ORAL DAILY
Qty: 14 TABLET | Refills: 0 | Status: SHIPPED | OUTPATIENT
Start: 2018-08-24 | End: 2018-09-07

## 2018-08-24 RX ORDER — GABAPENTIN 100 MG/1
CAPSULE ORAL
Refills: 0 | COMMUNITY
Start: 2018-06-28 | End: 2018-08-24 | Stop reason: DRUGHIGH

## 2018-08-24 NOTE — PROGRESS NOTES
Father         liver    Cancer Sister         breast    Cancer Brother         spinal    Cancer Maternal Aunt         Breast    Cancer Paternal Aunt         Breast    Cancer Daughter         Cervical     SOCIAL HISTORY     Social History     Social History    Marital status:      Spouse name: N/A    Number of children: N/A    Years of education: N/A     Occupational History    Not on file. Social History Main Topics    Smoking status: Former Smoker     Packs/day: 0.50     Years: 20.00     Types: Cigarettes     Quit date: 3/20/2018    Smokeless tobacco: Never Used    Alcohol use No    Drug use: No    Sexual activity: No     Other Topics Concern    Not on file     Social History Narrative    No narrative on file     SURGICAL HISTORY     Past Surgical History:   Procedure Laterality Date    ABDOMEN SURGERY  02/2016    ruptured bowel    ABDOMINAL ADHESION SURGERY      ABDOMINAL EXPLORATION SURGERY N/A 02/24/2017    explor lap possible lysis of adhsions possible bowel resection    APPENDECTOMY      CHOLECYSTECTOMY      CHOLECYSTECTOMY, LAPAROSCOPIC N/A 3/28/13    LYSIS OF ADHESIONS    COLONOSCOPY      unsure of the date/ polyps removed    COLONOSCOPY  05/30/2018    Poor prep, procedure aborted.     CYSTOSCOPY  9/11/12    lyssa stents placed for surgery and removed    HERNIA REPAIR  01/17/2018    [address] WITH MESH    HYSTERECTOMY      PELVIC LAPAROSCOPY  7/3/12    [place] COLON CA SCRN NOT  [address] IND N/A 8/1/2018    COLONOSCOPY performed by William Hernandez MD at [address] EGD TRANSORAL BIOPSY SINGLE/MULTIPLE  8/1/2018    EGD BIOPSY performed by William Hernandez MD at [address] SALPINGO-OOPHORECTOMY  9/11/12    BILATERAL SALPINGO-OOPHORECTOMY; LYSIS OF ADHESIONS;;RETROPERITONEAL RESECTION DISTAL URETERS    SHOULDER ARTHROSCOPY Left 7/8/14    TUBAL LIGATION      UPPER GASTROINTESTINAL ENDOSCOPY 05/30/2018    Mild distal esophageal ring- dilated with 15 mm, gastritis     CURRENT MEDICATIONS   (This list may include medications prescribed during this encounter as epic can not insert only the list prior to this encounter.)  Current Outpatient Rx   Medication Sig Dispense Refill    gabapentin (NEURONTIN) 300 MG capsule Take 300 mg by mouth 3 times daily as needed. Calista Carusootf esomeprazole (NEXIUM) 40 MG delayed release capsule Take 1 capsule by mouth 2 times daily (before meals) 60 capsule 3    metoprolol succinate (TOPROL XL) 25 MG extended release tablet Take 25 mg by mouth daily      meclizine (ANTIVERT) 12.5 MG tablet Take 12.5 mg by mouth 3 times daily as needed          ALLERGIES     Allergies   Allergen Reactions    Amoxicillin      dehydration    Eggs [Egg White]      Severe stomach pain    Other Other (See Comments)     Flu immunization; severely sick due to egg whites in shot       IMMUNIZATIONS     There is no immunization history on file for this patient. REVIEW OF SYSTEMS     Constitutional: Negative for fever and unexpected weight change. Respiratory: Negative for cough, shortness of breath and wheezing. Cardiovascular: Negative for chest pain, palpitations and leg swelling. Gastrointestinal: see HPI for details. Musculoskeletal: Positive for arthralgias and back pain. Neurological: Negative for seizures, syncope. Psychiatric/Behavioral: negative for confusion, agitation, hallucinations. PHYSICAL EXAM   VITAL SIGNS: Wt 184 lb (83.5 kg)   LMP 01/01/2006   BMI 29.70 kg/m²   Wt Readings from Last 3 Encounters:   08/24/18 184 lb (83.5 kg)   08/09/18 185 lb (83.9 kg)   07/19/18 185 lb (83.9 kg)     Constitutional: AAO3, No acute distress  HEENT: no pallor or icterus. Abdomen: soft, non tender, not distended, BS+. No hepatosplenomegaly.   Extremities:  No edema    RADIOLOGY/PROCEDURES     EXAMINATION:   CT OF THE ABDOMEN AND PELVIS WITH CONTRAST 7/19/2018 2:56 pm       TECHNIQUE: CT of the abdomen and pelvis was performed with the administration of   intravenous contrast. Multiplanar reformatted images are provided for review. Dose modulation, iterative reconstruction, and/or weight based adjustment of   the mA/kV was utilized to reduce the radiation dose to as low as reasonably   achievable.       COMPARISON:   CT 10/27/2017       HISTORY:   ORDERING SYSTEM PROVIDED HISTORY: abdominal pain, hx of bowel obstruction   TECHNOLOGIST PROVIDED HISTORY:   If patient is on cardiac monitor and/or pulse ox, they may be taken off   cardiac monitor and pulse ox, left on O2 if currently on. All monitors   reattached when patient returns to room. Additional Contrast?->None   Ordering Physician Provided Reason for Exam: mid abd pain several days   Acuity: Acute   Type of Exam: Initial   Relevant Medical/Surgical History: prev surgery for bowel obstruction,   hysterectomy       FINDINGS:   Lower Chest: Clear lungs.       Organs: Cholecystectomy.  Hepatic steatosis.  The adrenals, spleen pancreas   are normal.  No hydronephrosis.       GI/Bowel: No evidence of bowel obstruction.  Postsurgical changes in the   right lower quadrant small bowel.       Pelvis: Prior hysterectomy.  Bladder is normal.       Peritoneum/Retroperitoneum: No evidence of AAA or lymphadenopathy.       Bones/Soft Tissues: No suspicious osseous lesions.  Postsurgical changes of   hernia repair.           Impression   No acute intra-abdominal abnormality or explanation for pain.       Hepatic steatosis. FINAL IMPRESSION   H pylori gastritis - initial quadruple treatment with Bismuth based therapy did not work. Repeat EGD with biopsies do not show any atypical lymphoid infiltrate, based on path but continue to show H pylori. Repeat H pylori treatment with a different regimen, she is allergic to PCN so will use Levofloxacin 500 mg daily, Metronidazole 250 mg four times daily, take BID PPI for 14 days.  Script sent to pharmacy. Alternating diarrhea and constipation - better since she started eating healthier. She did not try the low FODMAP diet as discussed last time, handout given about this again. GERD - controlled with bid Nexium.       CC:  Rowdy Guardado

## 2018-10-29 ENCOUNTER — HOSPITAL ENCOUNTER (OUTPATIENT)
Age: 48
Discharge: HOME | End: 2018-10-29
Payer: COMMERCIAL

## 2018-10-29 DIAGNOSIS — B96.81 HELICOBACTER POSITIVE GASTRITIS: ICD-10-CM

## 2018-10-29 DIAGNOSIS — K29.70 HELICOBACTER POSITIVE GASTRITIS: ICD-10-CM

## 2018-10-29 PROCEDURE — 87338 HPYLORI STOOL AG IA: CPT

## 2018-11-01 LAB — H PYLORI ANTIGEN STOOL: POSITIVE

## 2018-11-19 ENCOUNTER — OFFICE VISIT (OUTPATIENT)
Dept: GASTROENTEROLOGY | Age: 48
End: 2018-11-19
Payer: COMMERCIAL

## 2018-11-19 VITALS
SYSTOLIC BLOOD PRESSURE: 124 MMHG | DIASTOLIC BLOOD PRESSURE: 78 MMHG | HEIGHT: 66 IN | BODY MASS INDEX: 30.53 KG/M2 | WEIGHT: 190 LBS

## 2018-11-19 DIAGNOSIS — K29.70 HELICOBACTER POSITIVE GASTRITIS: Primary | ICD-10-CM

## 2018-11-19 DIAGNOSIS — B96.81 HELICOBACTER POSITIVE GASTRITIS: Primary | ICD-10-CM

## 2018-11-19 DIAGNOSIS — R07.89 ATYPICAL CHEST PAIN: ICD-10-CM

## 2018-11-19 DIAGNOSIS — K21.9 GASTROESOPHAGEAL REFLUX DISEASE WITHOUT ESOPHAGITIS: ICD-10-CM

## 2018-11-19 PROCEDURE — 99213 OFFICE O/P EST LOW 20 MIN: CPT | Performed by: INTERNAL MEDICINE

## 2018-11-19 RX ORDER — NITAZOXANIDE 500 MG/1
500 TABLET ORAL 2 TIMES DAILY WITH MEALS
Qty: 20 TABLET | Refills: 0 | Status: SHIPPED | OUTPATIENT
Start: 2018-11-19 | End: 2018-11-29

## 2018-11-19 RX ORDER — DOXYCYCLINE HYCLATE 100 MG
100 TABLET ORAL 2 TIMES DAILY
Qty: 20 TABLET | Refills: 0 | Status: SHIPPED | OUTPATIENT
Start: 2018-11-19 | End: 2018-11-29

## 2018-11-19 RX ORDER — LEVOFLOXACIN 250 MG/1
250 TABLET ORAL DAILY
Qty: 10 TABLET | Refills: 0 | Status: SHIPPED | OUTPATIENT
Start: 2018-11-19 | End: 2018-11-29

## 2018-11-19 RX ORDER — ESOMEPRAZOLE MAGNESIUM 40 MG/1
40 CAPSULE, DELAYED RELEASE ORAL
Qty: 90 CAPSULE | Refills: 1 | Status: SHIPPED | OUTPATIENT
Start: 2018-11-19 | End: 2020-05-10

## 2019-08-01 ENCOUNTER — HOSPITAL ENCOUNTER (EMERGENCY)
Age: 49
Discharge: HOME | End: 2019-08-01
Attending: EMERGENCY MEDICINE
Payer: COMMERCIAL

## 2019-08-01 ENCOUNTER — APPOINTMENT (OUTPATIENT)
Dept: CT IMAGING | Age: 49
End: 2019-08-01
Payer: COMMERCIAL

## 2019-08-01 VITALS
HEIGHT: 66 IN | SYSTOLIC BLOOD PRESSURE: 110 MMHG | WEIGHT: 190 LBS | RESPIRATION RATE: 14 BRPM | DIASTOLIC BLOOD PRESSURE: 81 MMHG | HEART RATE: 70 BPM | BODY MASS INDEX: 30.53 KG/M2 | OXYGEN SATURATION: 100 % | TEMPERATURE: 98.5 F

## 2019-08-01 DIAGNOSIS — R42 DIZZINESS: Primary | ICD-10-CM

## 2019-08-01 LAB
A/G RATIO: 1.7 (ref 1.1–2.2)
ALBUMIN SERPL-MCNC: 4.4 G/DL (ref 3.4–5)
ALP BLD-CCNC: 136 U/L (ref 40–129)
ALT SERPL-CCNC: 32 U/L (ref 10–40)
ANION GAP SERPL CALCULATED.3IONS-SCNC: 12 MMOL/L (ref 3–16)
AST SERPL-CCNC: 22 U/L (ref 15–37)
BASOPHILS ABSOLUTE: 0.1 K/UL (ref 0–0.2)
BASOPHILS RELATIVE PERCENT: 1.3 %
BILIRUB SERPL-MCNC: <0.2 MG/DL (ref 0–1)
BILIRUBIN URINE: NEGATIVE
BLOOD, URINE: NEGATIVE
BUN BLDV-MCNC: 15 MG/DL (ref 7–20)
CALCIUM SERPL-MCNC: 9.6 MG/DL (ref 8.3–10.6)
CHLORIDE BLD-SCNC: 103 MMOL/L (ref 99–110)
CLARITY: CLEAR
CO2: 25 MMOL/L (ref 21–32)
COLOR: NORMAL
CREAT SERPL-MCNC: 0.8 MG/DL (ref 0.6–1.1)
EKG ATRIAL RATE: 80 BPM
EKG DIAGNOSIS: NORMAL
EKG P AXIS: 54 DEGREES
EKG P-R INTERVAL: 164 MS
EKG Q-T INTERVAL: 402 MS
EKG QRS DURATION: 100 MS
EKG QTC CALCULATION (BAZETT): 463 MS
EKG R AXIS: 37 DEGREES
EKG T AXIS: 32 DEGREES
EKG VENTRICULAR RATE: 80 BPM
EOSINOPHILS ABSOLUTE: 0.2 K/UL (ref 0–0.6)
EOSINOPHILS RELATIVE PERCENT: 2.5 %
GFR AFRICAN AMERICAN: >60
GFR NON-AFRICAN AMERICAN: >60
GLOBULIN: 2.6 G/DL
GLUCOSE BLD-MCNC: 138 MG/DL (ref 70–99)
GLUCOSE URINE: NEGATIVE MG/DL
HCT VFR BLD CALC: 41.9 % (ref 36–48)
HEMOGLOBIN: 14.3 G/DL (ref 12–16)
KETONES, URINE: NEGATIVE MG/DL
LEUKOCYTE ESTERASE, URINE: NEGATIVE
LYMPHOCYTES ABSOLUTE: 1.8 K/UL (ref 1–5.1)
LYMPHOCYTES RELATIVE PERCENT: 28.1 %
MAGNESIUM: 1.9 MG/DL (ref 1.8–2.4)
MCH RBC QN AUTO: 32.3 PG (ref 26–34)
MCHC RBC AUTO-ENTMCNC: 34.3 G/DL (ref 31–36)
MCV RBC AUTO: 94.3 FL (ref 80–100)
MICROSCOPIC EXAMINATION: NORMAL
MONOCYTES ABSOLUTE: 0.3 K/UL (ref 0–1.3)
MONOCYTES RELATIVE PERCENT: 5.5 %
NEUTROPHILS ABSOLUTE: 3.9 K/UL (ref 1.7–7.7)
NEUTROPHILS RELATIVE PERCENT: 62.6 %
NITRITE, URINE: NEGATIVE
PDW BLD-RTO: 13.1 % (ref 12.4–15.4)
PH UA: 5.5 (ref 5–8)
PLATELET # BLD: 219 K/UL (ref 135–450)
PMV BLD AUTO: 8.3 FL (ref 5–10.5)
POTASSIUM SERPL-SCNC: 4 MMOL/L (ref 3.5–5.1)
PROTEIN UA: NEGATIVE MG/DL
RBC # BLD: 4.44 M/UL (ref 4–5.2)
SODIUM BLD-SCNC: 140 MMOL/L (ref 136–145)
SPECIFIC GRAVITY UA: <=1.005 (ref 1–1.03)
TOTAL PROTEIN: 7 G/DL (ref 6.4–8.2)
TROPONIN: <0.01 NG/ML
URINE TYPE: NORMAL
UROBILINOGEN, URINE: 0.2 E.U./DL
WBC # BLD: 6.2 K/UL (ref 4–11)

## 2019-08-01 PROCEDURE — 81003 URINALYSIS AUTO W/O SCOPE: CPT

## 2019-08-01 PROCEDURE — 96361 HYDRATE IV INFUSION ADD-ON: CPT

## 2019-08-01 PROCEDURE — 93005 ELECTROCARDIOGRAM TRACING: CPT | Performed by: EMERGENCY MEDICINE

## 2019-08-01 PROCEDURE — 2580000003 HC RX 258: Performed by: EMERGENCY MEDICINE

## 2019-08-01 PROCEDURE — 93010 ELECTROCARDIOGRAM REPORT: CPT | Performed by: INTERNAL MEDICINE

## 2019-08-01 PROCEDURE — 96360 HYDRATION IV INFUSION INIT: CPT

## 2019-08-01 PROCEDURE — 83735 ASSAY OF MAGNESIUM: CPT

## 2019-08-01 PROCEDURE — 84484 ASSAY OF TROPONIN QUANT: CPT

## 2019-08-01 PROCEDURE — 99285 EMERGENCY DEPT VISIT HI MDM: CPT

## 2019-08-01 PROCEDURE — 80053 COMPREHEN METABOLIC PANEL: CPT

## 2019-08-01 PROCEDURE — 70450 CT HEAD/BRAIN W/O DYE: CPT

## 2019-08-01 PROCEDURE — 85025 COMPLETE CBC W/AUTO DIFF WBC: CPT

## 2019-08-01 RX ORDER — 0.9 % SODIUM CHLORIDE 0.9 %
1000 INTRAVENOUS SOLUTION INTRAVENOUS ONCE
Status: COMPLETED | OUTPATIENT
Start: 2019-08-01 | End: 2019-08-01

## 2019-08-01 RX ADMIN — SODIUM CHLORIDE 1000 ML: 9 INJECTION, SOLUTION INTRAVENOUS at 09:12

## 2019-08-01 NOTE — ED PROVIDER NOTES
Result Value Ref Range    Sodium 140 136 - 145 mmol/L    Potassium 4.0 3.5 - 5.1 mmol/L    Chloride 103 99 - 110 mmol/L    CO2 25 21 - 32 mmol/L    Anion Gap 12 3 - 16    Glucose 138 (H) 70 - 99 mg/dL    BUN 15 7 - 20 mg/dL    CREATININE 0.8 0.6 - 1.1 mg/dL    GFR Non-African American >60 >60    GFR African American >60 >60    Calcium 9.6 8.3 - 10.6 mg/dL    Total Protein 7.0 6.4 - 8.2 g/dL    Alb 4.4 3.4 - 5.0 g/dL    Albumin/Globulin Ratio 1.7 1.1 - 2.2    Total Bilirubin <0.2 0.0 - 1.0 mg/dL    Alkaline Phosphatase 136 (H) 40 - 129 U/L    ALT 32 10 - 40 U/L    AST 22 15 - 37 U/L    Globulin 2.6 g/dL   CBC Auto Differential   Result Value Ref Range    WBC 6.2 4.0 - 11.0 K/uL    RBC 4.44 4.00 - 5.20 M/uL    Hemoglobin 14.3 12.0 - 16.0 g/dL    Hematocrit 41.9 36.0 - 48.0 %    MCV 94.3 80.0 - 100.0 fL    MCH 32.3 26.0 - 34.0 pg    MCHC 34.3 31.0 - 36.0 g/dL    RDW 13.1 12.4 - 15.4 %    Platelets 517 287 - 984 K/uL    MPV 8.3 5.0 - 10.5 fL    Neutrophils % 62.6 %    Lymphocytes % 28.1 %    Monocytes % 5.5 %    Eosinophils % 2.5 %    Basophils % 1.3 %    Neutrophils # 3.9 1.7 - 7.7 K/uL    Lymphocytes # 1.8 1.0 - 5.1 K/uL    Monocytes # 0.3 0.0 - 1.3 K/uL    Eosinophils # 0.2 0.0 - 0.6 K/uL    Basophils # 0.1 0.0 - 0.2 K/uL   Magnesium   Result Value Ref Range    Magnesium 1.90 1.80 - 2.40 mg/dL   Urinalysis   Result Value Ref Range    Color, UA Straw Straw/Yellow    Clarity, UA Clear Clear    Glucose, Ur Negative Negative mg/dL    Bilirubin Urine Negative Negative    Ketones, Urine Negative Negative mg/dL    Specific Gravity, UA <=1.005 1.005 - 1.030    Blood, Urine Negative Negative    pH, UA 5.5 5.0 - 8.0    Protein, UA Negative Negative mg/dL    Urobilinogen, Urine 0.2 <2.0 E.U./dL    Nitrite, Urine Negative Negative    Leukocyte Esterase, Urine Negative Negative    Microscopic Examination Not Indicated     Urine Type Not Specified    EKG 12 Lead   Result Value Ref Range    Ventricular Rate 80 BPM    Atrial Rate

## 2020-05-10 ENCOUNTER — HOSPITAL ENCOUNTER (EMERGENCY)
Age: 50
Discharge: HOME | End: 2020-05-10
Attending: EMERGENCY MEDICINE

## 2020-05-10 ENCOUNTER — APPOINTMENT (OUTPATIENT)
Dept: GENERAL RADIOLOGY | Age: 50
End: 2020-05-10

## 2020-05-10 VITALS
SYSTOLIC BLOOD PRESSURE: 119 MMHG | HEART RATE: 70 BPM | DIASTOLIC BLOOD PRESSURE: 85 MMHG | RESPIRATION RATE: 12 BRPM | TEMPERATURE: 98.1 F | OXYGEN SATURATION: 99 %

## 2020-05-10 LAB
A/G RATIO: 1.6 (ref 1.1–2.2)
ALBUMIN SERPL-MCNC: 4.2 G/DL (ref 3.4–5)
ALP BLD-CCNC: 126 U/L (ref 40–129)
ALT SERPL-CCNC: 27 U/L (ref 10–40)
ANION GAP SERPL CALCULATED.3IONS-SCNC: 12 MMOL/L (ref 3–16)
AST SERPL-CCNC: 22 U/L (ref 15–37)
BILIRUB SERPL-MCNC: <0.2 MG/DL (ref 0–1)
BUN BLDV-MCNC: 15 MG/DL (ref 7–20)
CALCIUM SERPL-MCNC: 9.7 MG/DL (ref 8.3–10.6)
CHLORIDE BLD-SCNC: 103 MMOL/L (ref 99–110)
CO2: 26 MMOL/L (ref 21–32)
CREAT SERPL-MCNC: 0.7 MG/DL (ref 0.6–1.1)
EKG ATRIAL RATE: 79 BPM
EKG DIAGNOSIS: NORMAL
EKG P AXIS: 63 DEGREES
EKG P-R INTERVAL: 162 MS
EKG Q-T INTERVAL: 370 MS
EKG QRS DURATION: 94 MS
EKG QTC CALCULATION (BAZETT): 424 MS
EKG R AXIS: 15 DEGREES
EKG T AXIS: 38 DEGREES
EKG VENTRICULAR RATE: 79 BPM
GFR AFRICAN AMERICAN: >60
GFR NON-AFRICAN AMERICAN: >60
GLOBULIN: 2.7 G/DL
GLUCOSE BLD-MCNC: 101 MG/DL (ref 70–99)
HCT VFR BLD CALC: 39.9 % (ref 36–48)
HEMOGLOBIN: 13.5 G/DL (ref 12–16)
MAGNESIUM: 1.9 MG/DL (ref 1.8–2.4)
MCH RBC QN AUTO: 32.3 PG (ref 26–34)
MCHC RBC AUTO-ENTMCNC: 33.9 G/DL (ref 31–36)
MCV RBC AUTO: 95.4 FL (ref 80–100)
PDW BLD-RTO: 13 % (ref 12.4–15.4)
PLATELET # BLD: 277 K/UL (ref 135–450)
PMV BLD AUTO: 8.3 FL (ref 5–10.5)
POTASSIUM SERPL-SCNC: 4 MMOL/L (ref 3.5–5.1)
RBC # BLD: 4.19 M/UL (ref 4–5.2)
SODIUM BLD-SCNC: 141 MMOL/L (ref 136–145)
TOTAL PROTEIN: 6.9 G/DL (ref 6.4–8.2)
TROPONIN: <0.01 NG/ML
TROPONIN: <0.01 NG/ML
WBC # BLD: 10.3 K/UL (ref 4–11)

## 2020-05-10 PROCEDURE — 6360000002 HC RX W HCPCS: Performed by: NURSE PRACTITIONER

## 2020-05-10 PROCEDURE — 96374 THER/PROPH/DIAG INJ IV PUSH: CPT

## 2020-05-10 PROCEDURE — 71046 X-RAY EXAM CHEST 2 VIEWS: CPT

## 2020-05-10 PROCEDURE — 84484 ASSAY OF TROPONIN QUANT: CPT

## 2020-05-10 PROCEDURE — 93005 ELECTROCARDIOGRAM TRACING: CPT | Performed by: NURSE PRACTITIONER

## 2020-05-10 PROCEDURE — 93010 ELECTROCARDIOGRAM REPORT: CPT | Performed by: INTERNAL MEDICINE

## 2020-05-10 PROCEDURE — 83735 ASSAY OF MAGNESIUM: CPT

## 2020-05-10 PROCEDURE — 80053 COMPREHEN METABOLIC PANEL: CPT

## 2020-05-10 PROCEDURE — 99285 EMERGENCY DEPT VISIT HI MDM: CPT

## 2020-05-10 PROCEDURE — 85027 COMPLETE CBC AUTOMATED: CPT

## 2020-05-10 RX ORDER — KETOROLAC TROMETHAMINE 30 MG/ML
30 INJECTION, SOLUTION INTRAMUSCULAR; INTRAVENOUS ONCE
Status: COMPLETED | OUTPATIENT
Start: 2020-05-10 | End: 2020-05-10

## 2020-05-10 RX ORDER — NAPROXEN 500 MG/1
500 TABLET ORAL 2 TIMES DAILY PRN
Qty: 20 TABLET | Refills: 0 | Status: SHIPPED | OUTPATIENT
Start: 2020-05-10 | End: 2021-11-11

## 2020-05-10 RX ADMIN — KETOROLAC TROMETHAMINE 30 MG: 30 INJECTION, SOLUTION INTRAMUSCULAR at 18:41

## 2020-05-10 ASSESSMENT — PAIN SCALES - GENERAL
PAINLEVEL_OUTOF10: 8
PAINLEVEL_OUTOF10: 7
PAINLEVEL_OUTOF10: 9

## 2020-05-10 ASSESSMENT — PAIN DESCRIPTION - LOCATION
LOCATION: CHEST
LOCATION: CHEST;BACK

## 2020-05-10 ASSESSMENT — PAIN DESCRIPTION - DESCRIPTORS
DESCRIPTORS: CONSTANT;TIGHTNESS;STABBING
DESCRIPTORS: CONSTANT

## 2020-05-10 ASSESSMENT — PAIN DESCRIPTION - PAIN TYPE
TYPE: ACUTE PAIN
TYPE: ACUTE PAIN

## 2020-05-11 NOTE — ED PROVIDER NOTES
 COLONOSCOPY      unsure of the date/ polyps removed    COLONOSCOPY  05/30/2018    Poor prep, procedure aborted.     CYSTOSCOPY  9/11/12    lyssa stents placed for surgery and removed    HERNIA REPAIR  01/17/2018    [facility]IN[facility] INCISIONAL HERNIA REPAIR WITH MESH    HYSTERECTOMY      PELVIC LAPAROSCOPY  7/3/12    [place] COLON CA SCRN NOT  [address] IND N/A 8/1/2018    COLONOSCOPY performed by Audra Lo MD at [address] EGD TRANSORAL BIOPSY SINGLE/MULTIPLE  8/1/2018    EGD BIOPSY performed by Audra Lo MD at [address] SALPINGO-OOPHORECTOMY  9/11/12    BILATERAL SALPINGO-OOPHORECTOMY; LYSIS OF ADHESIONS;;RETROPERITONEAL RESECTION DISTAL URETERS    SHOULDER ARTHROSCOPY Left 7/8/14    TUBAL LIGATION      UPPER GASTROINTESTINAL ENDOSCOPY  05/30/2018    Mild distal esophageal ring- dilated with 15 mm, gastritis         CURRENT MEDICATIONS:       Discharge Medication List as of 5/10/2020  7:14 PM      CONTINUE these medications which have NOT CHANGED    Details   vitamin D (ERGOCALCIFEROL) 64046 units CAPS capsule TAKE ONE CAPSULE BY MOUTH ONE TIME PER WEEK, R-3Historical Med      meclizine (ANTIVERT) 25 MG tablet Take 25 mg by mouthHistorical Med      metoprolol succinate (TOPROL XL) 25 MG extended release tablet Take 25 mg by mouth dailyHistorical Med               ALLERGIES:    Amoxicillin; Eggs [egg white]; and Other    FAMILY HISTORY:       Family History   Problem Relation Age of Onset    Diabetes Mother     Cancer Father         liver    Cancer Sister         breast    Cancer Brother         spinal    Cancer Maternal Aunt         Breast    Cancer Paternal Aunt         Breast    Cancer Daughter         Cervical          SOCIAL HISTORY:       Social History     Socioeconomic History    Marital status:      Spouse name: Not on file    Number of children: Not on file    Years of education: Not on file    Highest education

## 2021-11-11 ENCOUNTER — APPOINTMENT (OUTPATIENT)
Dept: GENERAL RADIOLOGY | Age: 51
End: 2021-11-11
Payer: COMMERCIAL

## 2021-11-11 ENCOUNTER — HOSPITAL ENCOUNTER (EMERGENCY)
Age: 51
Discharge: HOME | End: 2021-11-11
Attending: EMERGENCY MEDICINE
Payer: COMMERCIAL

## 2021-11-11 VITALS
OXYGEN SATURATION: 100 % | SYSTOLIC BLOOD PRESSURE: 129 MMHG | DIASTOLIC BLOOD PRESSURE: 90 MMHG | RESPIRATION RATE: 19 BRPM | TEMPERATURE: 98.1 F | HEART RATE: 75 BPM

## 2021-11-11 DIAGNOSIS — R00.2 PALPITATIONS: Primary | ICD-10-CM

## 2021-11-11 LAB
A/G RATIO: 1.6 (ref 1.1–2.2)
ALBUMIN SERPL-MCNC: 4.1 G/DL (ref 3.4–5)
ALP BLD-CCNC: 122 U/L (ref 40–129)
ALT SERPL-CCNC: <5 U/L (ref 10–40)
ANION GAP SERPL CALCULATED.3IONS-SCNC: 10 MMOL/L (ref 3–16)
AST SERPL-CCNC: 12 U/L (ref 15–37)
BASOPHILS ABSOLUTE: 0.1 K/UL (ref 0–0.2)
BASOPHILS RELATIVE PERCENT: 1 %
BILIRUB SERPL-MCNC: <0.2 MG/DL (ref 0–1)
BUN BLDV-MCNC: 10 MG/DL (ref 7–20)
CALCIUM SERPL-MCNC: 9 MG/DL (ref 8.3–10.6)
CHLORIDE BLD-SCNC: 108 MMOL/L (ref 99–110)
CO2: 25 MMOL/L (ref 21–32)
CREAT SERPL-MCNC: 0.7 MG/DL (ref 0.6–1.1)
D DIMER: 206 NG/ML DDU (ref 0–229)
EKG ATRIAL RATE: 95 BPM
EKG DIAGNOSIS: NORMAL
EKG P AXIS: 69 DEGREES
EKG P-R INTERVAL: 166 MS
EKG Q-T INTERVAL: 368 MS
EKG QRS DURATION: 94 MS
EKG QTC CALCULATION (BAZETT): 462 MS
EKG R AXIS: 36 DEGREES
EKG T AXIS: 48 DEGREES
EKG VENTRICULAR RATE: 95 BPM
EOSINOPHILS ABSOLUTE: 0.3 K/UL (ref 0–0.6)
EOSINOPHILS RELATIVE PERCENT: 3.2 %
GFR AFRICAN AMERICAN: >60
GFR NON-AFRICAN AMERICAN: >60
GLUCOSE BLD-MCNC: 122 MG/DL (ref 70–99)
HCT VFR BLD CALC: 40.5 % (ref 36–48)
HEMOGLOBIN: 13.8 G/DL (ref 12–16)
LYMPHOCYTES ABSOLUTE: 1.9 K/UL (ref 1–5.1)
LYMPHOCYTES RELATIVE PERCENT: 21.7 %
MCH RBC QN AUTO: 32.4 PG (ref 26–34)
MCHC RBC AUTO-ENTMCNC: 34 G/DL (ref 31–36)
MCV RBC AUTO: 95.1 FL (ref 80–100)
MONOCYTES ABSOLUTE: 0.5 K/UL (ref 0–1.3)
MONOCYTES RELATIVE PERCENT: 6 %
NEUTROPHILS ABSOLUTE: 6.1 K/UL (ref 1.7–7.7)
NEUTROPHILS RELATIVE PERCENT: 68.1 %
PDW BLD-RTO: 12.9 % (ref 12.4–15.4)
PLATELET # BLD: 241 K/UL (ref 135–450)
PMV BLD AUTO: 8.5 FL (ref 5–10.5)
POTASSIUM REFLEX MAGNESIUM: 3.8 MMOL/L (ref 3.5–5.1)
PRO-BNP: 34 PG/ML (ref 0–124)
RBC # BLD: 4.26 M/UL (ref 4–5.2)
SODIUM BLD-SCNC: 143 MMOL/L (ref 136–145)
TOTAL PROTEIN: 6.6 G/DL (ref 6.4–8.2)
TROPONIN: <0.01 NG/ML
TSH REFLEX: 1.41 UIU/ML (ref 0.27–4.2)
WBC # BLD: 8.9 K/UL (ref 4–11)

## 2021-11-11 PROCEDURE — 85379 FIBRIN DEGRADATION QUANT: CPT

## 2021-11-11 PROCEDURE — 99283 EMERGENCY DEPT VISIT LOW MDM: CPT

## 2021-11-11 PROCEDURE — 93005 ELECTROCARDIOGRAM TRACING: CPT | Performed by: EMERGENCY MEDICINE

## 2021-11-11 PROCEDURE — 84443 ASSAY THYROID STIM HORMONE: CPT

## 2021-11-11 PROCEDURE — 2580000003 HC RX 258: Performed by: EMERGENCY MEDICINE

## 2021-11-11 PROCEDURE — 83880 ASSAY OF NATRIURETIC PEPTIDE: CPT

## 2021-11-11 PROCEDURE — 85025 COMPLETE CBC W/AUTO DIFF WBC: CPT

## 2021-11-11 PROCEDURE — 93010 ELECTROCARDIOGRAM REPORT: CPT | Performed by: INTERNAL MEDICINE

## 2021-11-11 PROCEDURE — 80053 COMPREHEN METABOLIC PANEL: CPT

## 2021-11-11 PROCEDURE — 84484 ASSAY OF TROPONIN QUANT: CPT

## 2021-11-11 PROCEDURE — 71046 X-RAY EXAM CHEST 2 VIEWS: CPT

## 2021-11-11 RX ORDER — 0.9 % SODIUM CHLORIDE 0.9 %
1000 INTRAVENOUS SOLUTION INTRAVENOUS ONCE
Status: COMPLETED | OUTPATIENT
Start: 2021-11-11 | End: 2021-11-11

## 2021-11-11 RX ADMIN — SODIUM CHLORIDE 1000 ML: 9 INJECTION, SOLUTION INTRAVENOUS at 10:55

## 2021-11-11 ASSESSMENT — PAIN SCALES - GENERAL: PAINLEVEL_OUTOF10: 2

## 2021-11-11 NOTE — ED PROVIDER NOTES
[address] ED    CHIEF COMPLAINT  Palpitations (per EMS> pt c/o s/s s/p Moderna Covid vaccine 2 wks ago. c/o increased SOB w exertion. heart palpitations. hx anxiety. taking metoprolol as rx. c/o hot flashes. H/A. nausea. hx covid feb 2021. no change in o2 sat w exertion during triage)       HISTORY OF PRESENT ILLNESS  Court Keller is a 46 y.o. female with PMH including HTN, paroxysmal a-fib who presents to the ED with complaint of palpitations. Symptoms started approximately two weeks ago, shortly after receiving her second dose of the COVID vaccine. Also notes mild hypertension, SBP in the 150s. HR in the 110s. Now with dyspnea with exertion. Denies fever, cough, congestion. Complains of nausea. Emesis several weeks ago, none since. No diarrhea. Denies abdominal pain. Complains of a shooting pain across the chest that comes and goes. Denies dysuria, hematuria. Complains of swelling of the hands and face that seems to occur in the morning. Pt follows with Dr. Kaitlyn Emmanuel / [facility] / Cardiology for palpitations. Takes metoprolol which previously seemed to help. Patient smokes 1/2 ppd. Thinking about quitting. Patient with history of HTN, HLD. No personal history of DM. Dad - MI in 45s. No other complaints, modifying factors or associated symptoms.      I have reviewed the following from the nursing documentation:    Past Medical History:   Diagnosis Date    Anxiety     Arthritis     Fibromyalgia     Hyperlipidemia     No medications    Hypertension     Incisional hernia     Irritable bowel syndrome     Vertigo      Past Surgical History:   Procedure Laterality Date    ABDOMEN SURGERY  02/2016    ruptured bowel    ABDOMINAL ADHESION SURGERY      ABDOMINAL EXPLORATION SURGERY N/A 02/24/2017    explor lap possible lysis of adhsions possible bowel resection    APPENDECTOMY      CHOLECYSTECTOMY      CHOLECYSTECTOMY, LAPAROSCOPIC N/A 3/28/13    LYSIS OF ADHESIONS    COLONOSCOPY      unsure of the date/ polyps removed    COLONOSCOPY  05/30/2018    Poor prep, procedure aborted.     CYSTOSCOPY  9/11/12    lyssa stents placed for surgery and removed    HERNIA REPAIR  01/17/2018    [address] WITH MESH    HYSTERECTOMY      PELVIC LAPAROSCOPY  7/3/12    MI COLON CA SCRN NOT  [address] IND N/A 8/1/2018    COLONOSCOPY performed by Mandy Galdamez MD at [address] EGD TRANSORAL BIOPSY SINGLE/MULTIPLE  8/1/2018    EGD BIOPSY performed by Mandy Galdamez MD at [address] [facility] SALPINGO-OOPHORECTOMY  9/11/12    BILATERAL SALPINGO-OOPHORECTOMY; LYSIS OF ADHESIONS;;RETROPERITONEAL RESECTION DISTAL URETERS    SHOULDER ARTHROSCOPY Left 7/8/14    TUBAL LIGATION      UPPER GASTROINTESTINAL ENDOSCOPY  05/30/2018    Mild distal esophageal ring- dilated with 15 mm, gastritis     Family History   Problem Relation Age of Onset    Diabetes Mother     Cancer Father         liver    Cancer Sister         breast    Cancer Brother         spinal    Cancer Maternal Aunt         Breast    Cancer Paternal Aunt         Breast    Cancer Daughter         Cervical     Social History     Socioeconomic History    Marital status:      Spouse name: Not on file    Number of children: Not on file    Years of education: Not on file    Highest education level: Not on file   Occupational History    Not on file   Tobacco Use    Smoking status: Current Every Day Smoker     Packs/day: 0.50     Years: 20.00     Pack years: 10.00     Types: Cigarettes     Last attempt to quit: 3/20/2018     Years since quitting: 3.6    Smokeless tobacco: Never Used   Vaping Use    Vaping Use: Former    Substances: Always   Substance and Sexual Activity    Alcohol use: No    Drug use: No    Sexual activity: Never   Other Topics Concern    Not on file   Social History Narrative    Not on file     Social Determinants of Health     Financial Resource Strain:  Difficulty of Paying Living Expenses: Not on file   Food Insecurity:     Worried About Running Out of Food in the Last Year: Not on file    Ran Out of Food in the Last Year: Not on file   Transportation Needs:     Lack of Transportation (Medical): Not on file    Lack of Transportation (Non-Medical): Not on file   Physical Activity:     Days of Exercise per Week: Not on file    Minutes of Exercise per Session: Not on file   Stress:     Feeling of Stress : Not on file   Social Connections:     Frequency of Communication with Friends and Family: Not on file    Frequency of Social Gatherings with Friends and Family: Not on file    Attends [employer] Services: Not on file    Active Member of [address] [place] [address] [employer] or Organizations: Not on file    Attends Club or Organization Meetings: Not on file    Marital Status: Not on file   Intimate Partner Violence:     Fear of Current or Ex-Partner: Not on file    Emotionally Abused: Not on file    Physically Abused: Not on file    Sexually Abused: Not on file   Housing Stability:     Unable to Pay for Housing in the Last Year: Not on file    Number of [place] in the Last Year: Not on file    Unstable Housing in the Last Year: Not on file     No current facility-administered medications for this encounter. Current Outpatient Medications   Medication Sig Dispense Refill    vitamin D (ERGOCALCIFEROL) 55310 units CAPS capsule TAKE ONE CAPSULE BY MOUTH ONE TIME PER WEEK  3    metoprolol succinate (TOPROL XL) 25 MG extended release tablet Take 25 mg by mouth daily       Allergies   Allergen Reactions    Amoxicillin      dehydration    Eggs [Egg White]      Severe stomach pain    Other Other (See Comments)     Flu immunization; severely sick due to egg whites in shot       REVIEW OF SYSTEMS  10 systems reviewed, pertinent positives and negatives per HPI, otherwise noted to be negative.     PHYSICAL EXAM  ED Triage Vitals [11/11/21 1012]   Enc Vitals Group      BP (!) 144/83      Pulse 98      Resp 18      Temp 98.1 °F (36.7 °C)      Temp src       SpO2 98 %      Weight       Height       Head Circumference       Peak Flow       Pain Score       Pain Loc       Pain Edu? Excl. in [address]? General appearance: Awake and alert. Cooperative. No acute distress. HENT: Normocephalic. Atraumatic. Mucous membranes are moist.  Neck: Supple. No thyromegaly. Eyes: PERRL. EOMI. Heart/Chest: RRR. No murmurs. Lungs: Respirations unlabored. CTAB. Good air exchange. Speaking comfortably in full sentences. Abdomen: Soft. Non-tender. Non-distended. No rebound or guarding. Musculoskeletal: No extremity edema. No deformity. No tenderness in the extremities. All extremities neurovascularly intact. Skin: Warm and dry. No acute rashes. Neurological: Alert and oriented. CN II-XII intact. Strength 5/5 bilateral upper and lower extremities. Sensation intact to light touch. Gait normal.  Psychiatric: Mood/affect: normal      LABS  I have reviewed all labs for this visit.    Results for orders placed or performed during the hospital encounter of 11/11/21   CBC Auto Differential   Result Value Ref Range    WBC 8.9 4.0 - 11.0 K/uL    RBC 4.26 4.00 - 5.20 M/uL    Hemoglobin 13.8 12.0 - 16.0 g/dL    Hematocrit 40.5 36.0 - 48.0 %    MCV 95.1 80.0 - 100.0 fL    MCH 32.4 26.0 - 34.0 pg    MCHC 34.0 31.0 - 36.0 g/dL    RDW 12.9 12.4 - 15.4 %    Platelets 970 992 - 671 K/uL    MPV 8.5 5.0 - 10.5 fL    Neutrophils % 68.1 %    Lymphocytes % 21.7 %    Monocytes % 6.0 %    Eosinophils % 3.2 %    Basophils % 1.0 %    Neutrophils Absolute 6.1 1.7 - 7.7 K/uL    Lymphocytes Absolute 1.9 1.0 - 5.1 K/uL    Monocytes Absolute 0.5 0.0 - 1.3 K/uL    Eosinophils Absolute 0.3 0.0 - 0.6 K/uL    Basophils Absolute 0.1 0.0 - 0.2 K/uL   Comprehensive Metabolic Panel w/ Reflex to MG   Result Value Ref Range    Sodium 143 136 - 145 mmol/L    Potassium reflex Magnesium 3.8 3.5 - 5.1 mmol/L    Chloride 108 99 - 110 mmol/L    CO2 25 21 - 32 mmol/L    Anion Gap 10 3 - 16    Glucose 122 (H) 70 - 99 mg/dL    BUN 10 7 - 20 mg/dL    CREATININE 0.7 0.6 - 1.1 mg/dL    GFR Non-African American >60 >60    GFR African American >60 >60    Calcium 9.0 8.3 - 10.6 mg/dL    Total Protein 6.6 6.4 - 8.2 g/dL    Albumin 4.1 3.4 - 5.0 g/dL    Albumin/Globulin Ratio 1.6 1.1 - 2.2    Total Bilirubin <0.2 0.0 - 1.0 mg/dL    Alkaline Phosphatase 122 40 - 129 U/L    ALT <5 (L) 10 - 40 U/L    AST 12 (L) 15 - 37 U/L   Troponin   Result Value Ref Range    Troponin <0.01 <0.01 ng/mL   D-dimer, quantitative   Result Value Ref Range    D-Dimer, Quant 206 0 - 229 ng/mL DDU   Brain Natriuretic Peptide   Result Value Ref Range    Pro-BNP 34 0 - 124 pg/mL   TSH with Reflex   Result Value Ref Range    TSH 1.41 0.27 - 4.20 uIU/mL   EKG 12 Lead   Result Value Ref Range    Ventricular Rate 95 BPM    Atrial Rate 95 BPM    P-R Interval 166 ms    QRS Duration 94 ms    Q-T Interval 368 ms    QTc Calculation (Bazett) 462 ms    P Axis 69 degrees    R Axis 36 degrees    T Axis 48 degrees    Diagnosis       Normal sinus rhythmIncomplete right bundle branch blockBorderline ECGNo significant change was foundWhen compared with ECG [id number] by SAIDA FORD, [address] ([id number]) on 11/11/2021 1:33:27 PM       RADIOLOGY  I have reviewed all radiographic studies for this visit. XR CHEST (2 VW)   Final Result   No active cardiopulmonary disease              ECG  EKG interpreted by myself. Rate: normal  Rhythm: NSR  Axis: normal  Intervals: within normal limits  ST Segments: no acute abnormality  T waves: no acute abnormality  Comparison: Compared to 5/10/2020, there is no significant change  Impression: NSR with incomplete RBBB       ED COURSE/MDM  Patient seen and evaluated. Old records reviewed. Labs and imaging reviewed and results discussed with patient/family to extent possible. 46 F with history of palpitations presents with same.  Patient slightly hypertensive w/ otherwise reassuring vitals. Cardiac exam no murmur. Lungs clear. Abdomen benign. No periph edema or JVD elevation. No calf tenderness or palpable cord. EKG NSR with incomplete RBBB. Patient monitored -- no dysrhythmias noted. CBC no leukocytosis or anemia. Renal panel no significant electrolyte abnormalities or creatinine elevation. Troponin and BNP within normal limits. TSH within normal limits. D-dimer within normal limits -with low pretest probability for pulmonary embolism and a negative D-dimer, I am not concerned for PE. Given reassuring exam, vital signs, diagnostics believe patient is appropriate for discharged home with further management in the outpatient setting. Will advise patient to follow closely with her cardiologist.  Otherwise close follow-up with PCP in several days. Usual strict return precautions communicated. During the patient's ED course, the patient was given:  Medications   0.9 % sodium chloride bolus (0 mLs IntraVENous Stopped 11/11/21 9177)        All questions were answered and the patient/family expressed understanding and agreement with the plan. PROCEDURES  None    CRITICAL CARE  N/A    CLINICAL IMPRESSION  1. Palpitations        DISPOSITION   discharge     Daniel Patel MD    Note: This chart was created using voice recognition dictation software. Efforts were made by me to ensure accuracy, however some errors may be present due to limitations of this technology and occasionally words are not transcribed correctly.         Daniel Patel MD  11/11/21 4079

## 2021-11-11 NOTE — ED NOTES
Discharge instructions given, pt verbalized understanding. Discussed follow-up appointments and medications. No questions or concerns at this time. Pt ambulated independently out of ER. Pt discharged with no prescriptions.       Cheri Garnett RN  11/11/21 8208

## 2022-11-28 ENCOUNTER — HOSPITAL ENCOUNTER (EMERGENCY)
Age: 52
Discharge: HOME | End: 2022-11-28
Payer: COMMERCIAL

## 2022-11-28 ENCOUNTER — APPOINTMENT (OUTPATIENT)
Dept: CT IMAGING | Age: 52
End: 2022-11-28
Payer: COMMERCIAL

## 2022-11-28 VITALS
HEIGHT: 66 IN | BODY MASS INDEX: 27.32 KG/M2 | RESPIRATION RATE: 16 BRPM | SYSTOLIC BLOOD PRESSURE: 140 MMHG | HEART RATE: 80 BPM | WEIGHT: 170 LBS | OXYGEN SATURATION: 100 % | DIASTOLIC BLOOD PRESSURE: 80 MMHG | TEMPERATURE: 97.9 F

## 2022-11-28 DIAGNOSIS — R10.9 LEFT FLANK PAIN: ICD-10-CM

## 2022-11-28 DIAGNOSIS — R31.29 HEMATURIA, MICROSCOPIC: ICD-10-CM

## 2022-11-28 DIAGNOSIS — N20.1 CALCULUS OF DISTAL LEFT URETER: Primary | ICD-10-CM

## 2022-11-28 LAB
A/G RATIO: 1.6 (ref 1.1–2.2)
ALBUMIN SERPL-MCNC: 4.2 G/DL (ref 3.4–5)
ALP BLD-CCNC: 132 U/L (ref 40–129)
ALT SERPL-CCNC: 10 U/L (ref 10–40)
ANION GAP SERPL CALCULATED.3IONS-SCNC: 11 MMOL/L (ref 3–16)
AST SERPL-CCNC: 13 U/L (ref 15–37)
BACTERIA: ABNORMAL /HPF
BASOPHILS ABSOLUTE: 0.1 K/UL (ref 0–0.2)
BASOPHILS RELATIVE PERCENT: 1.2 %
BILIRUB SERPL-MCNC: <0.2 MG/DL (ref 0–1)
BILIRUBIN URINE: NEGATIVE
BLOOD, URINE: ABNORMAL
BUN BLDV-MCNC: 11 MG/DL (ref 7–20)
CALCIUM SERPL-MCNC: 9.3 MG/DL (ref 8.3–10.6)
CHLORIDE BLD-SCNC: 101 MMOL/L (ref 99–110)
CLARITY: ABNORMAL
CO2: 27 MMOL/L (ref 21–32)
COLOR: YELLOW
CREAT SERPL-MCNC: 0.9 MG/DL (ref 0.6–1.1)
EOSINOPHILS ABSOLUTE: 0.2 K/UL (ref 0–0.6)
EOSINOPHILS RELATIVE PERCENT: 2.2 %
EPITHELIAL CELLS, UA: ABNORMAL /HPF (ref 0–5)
GFR SERPL CREATININE-BSD FRML MDRD: >60 ML/MIN/{1.73_M2}
GLUCOSE BLD-MCNC: 107 MG/DL (ref 70–99)
GLUCOSE URINE: NEGATIVE MG/DL
HCT VFR BLD CALC: 40.1 % (ref 36–48)
HEMOGLOBIN: 14.1 G/DL (ref 12–16)
KETONES, URINE: ABNORMAL MG/DL
LEUKOCYTE ESTERASE, URINE: NEGATIVE
LYMPHOCYTES ABSOLUTE: 1.5 K/UL (ref 1–5.1)
LYMPHOCYTES RELATIVE PERCENT: 18.8 %
MCH RBC QN AUTO: 32.7 PG (ref 26–34)
MCHC RBC AUTO-ENTMCNC: 35.2 G/DL (ref 31–36)
MCV RBC AUTO: 92.9 FL (ref 80–100)
MICROSCOPIC EXAMINATION: YES
MONOCYTES ABSOLUTE: 0.8 K/UL (ref 0–1.3)
MONOCYTES RELATIVE PERCENT: 9.9 %
NEUTROPHILS ABSOLUTE: 5.3 K/UL (ref 1.7–7.7)
NEUTROPHILS RELATIVE PERCENT: 67.9 %
NITRITE, URINE: NEGATIVE
PDW BLD-RTO: 13.4 % (ref 12.4–15.4)
PH UA: 5 (ref 5–8)
PLATELET # BLD: 244 K/UL (ref 135–450)
PMV BLD AUTO: 7.9 FL (ref 5–10.5)
POTASSIUM REFLEX MAGNESIUM: 3.7 MMOL/L (ref 3.5–5.1)
PROTEIN UA: ABNORMAL MG/DL
RBC # BLD: 4.31 M/UL (ref 4–5.2)
RBC UA: ABNORMAL /HPF (ref 0–4)
SODIUM BLD-SCNC: 139 MMOL/L (ref 136–145)
SPECIFIC GRAVITY UA: >=1.03 (ref 1–1.03)
TOTAL PROTEIN: 6.8 G/DL (ref 6.4–8.2)
URINE REFLEX TO CULTURE: ABNORMAL
URINE TYPE: ABNORMAL
UROBILINOGEN, URINE: 0.2 E.U./DL
WBC # BLD: 7.8 K/UL (ref 4–11)
WBC UA: ABNORMAL /HPF (ref 0–5)

## 2022-11-28 PROCEDURE — 74176 CT ABD & PELVIS W/O CONTRAST: CPT

## 2022-11-28 PROCEDURE — 81001 URINALYSIS AUTO W/SCOPE: CPT

## 2022-11-28 PROCEDURE — 36415 COLL VENOUS BLD VENIPUNCTURE: CPT

## 2022-11-28 PROCEDURE — 96374 THER/PROPH/DIAG INJ IV PUSH: CPT

## 2022-11-28 PROCEDURE — 99284 EMERGENCY DEPT VISIT MOD MDM: CPT

## 2022-11-28 PROCEDURE — 6370000000 HC RX 637 (ALT 250 FOR IP): Performed by: PHYSICIAN ASSISTANT

## 2022-11-28 PROCEDURE — 6360000002 HC RX W HCPCS: Performed by: PHYSICIAN ASSISTANT

## 2022-11-28 PROCEDURE — 85025 COMPLETE CBC W/AUTO DIFF WBC: CPT

## 2022-11-28 PROCEDURE — 80053 COMPREHEN METABOLIC PANEL: CPT

## 2022-11-28 PROCEDURE — 96375 TX/PRO/DX INJ NEW DRUG ADDON: CPT

## 2022-11-28 RX ORDER — ONDANSETRON 2 MG/ML
4 INJECTION INTRAMUSCULAR; INTRAVENOUS ONCE
Status: COMPLETED | OUTPATIENT
Start: 2022-11-28 | End: 2022-11-28

## 2022-11-28 RX ORDER — RIZATRIPTAN BENZOATE 10 MG/1
TABLET ORAL
COMMUNITY
Start: 2022-11-19

## 2022-11-28 RX ORDER — MECLIZINE HYDROCHLORIDE 25 MG/1
TABLET ORAL
COMMUNITY
Start: 2022-10-20

## 2022-11-28 RX ORDER — OXYCODONE HYDROCHLORIDE AND ACETAMINOPHEN 5; 325 MG/1; MG/1
1 TABLET ORAL EVERY 6 HOURS PRN
Qty: 10 TABLET | Refills: 0 | Status: SHIPPED | OUTPATIENT
Start: 2022-11-28 | End: 2022-12-01

## 2022-11-28 RX ORDER — MORPHINE SULFATE 4 MG/ML
4 INJECTION, SOLUTION INTRAMUSCULAR; INTRAVENOUS ONCE
Status: DISCONTINUED | OUTPATIENT
Start: 2022-11-28 | End: 2022-11-28

## 2022-11-28 RX ORDER — TAMSULOSIN HYDROCHLORIDE 0.4 MG/1
0.4 CAPSULE ORAL DAILY
Qty: 7 CAPSULE | Refills: 0 | Status: SHIPPED | OUTPATIENT
Start: 2022-11-28 | End: 2022-12-05

## 2022-11-28 RX ORDER — ONDANSETRON 4 MG/1
4 TABLET, FILM COATED ORAL EVERY 8 HOURS PRN
Qty: 10 TABLET | Refills: 0 | Status: SHIPPED | OUTPATIENT
Start: 2022-11-28

## 2022-11-28 RX ORDER — ACETAMINOPHEN 325 MG/1
650 TABLET ORAL ONCE
Status: COMPLETED | OUTPATIENT
Start: 2022-11-28 | End: 2022-11-28

## 2022-11-28 RX ORDER — TAMSULOSIN HYDROCHLORIDE 0.4 MG/1
0.4 CAPSULE ORAL ONCE
Status: COMPLETED | OUTPATIENT
Start: 2022-11-28 | End: 2022-11-28

## 2022-11-28 RX ORDER — TOPIRAMATE 50 MG/1
50 TABLET, FILM COATED ORAL 2 TIMES DAILY
COMMUNITY
Start: 2021-08-05

## 2022-11-28 RX ORDER — ROSUVASTATIN CALCIUM 5 MG/1
TABLET, COATED ORAL
COMMUNITY
Start: 2022-11-12

## 2022-11-28 RX ORDER — KETOROLAC TROMETHAMINE 30 MG/ML
15 INJECTION, SOLUTION INTRAMUSCULAR; INTRAVENOUS ONCE
Status: COMPLETED | OUTPATIENT
Start: 2022-11-28 | End: 2022-11-28

## 2022-11-28 RX ADMIN — ONDANSETRON HYDROCHLORIDE 4 MG: 2 INJECTION, SOLUTION INTRAMUSCULAR; INTRAVENOUS at 13:14

## 2022-11-28 RX ADMIN — KETOROLAC TROMETHAMINE 15 MG: 30 INJECTION, SOLUTION INTRAMUSCULAR; INTRAVENOUS at 13:13

## 2022-11-28 RX ADMIN — ACETAMINOPHEN 650 MG: 325 TABLET ORAL at 14:00

## 2022-11-28 RX ADMIN — TAMSULOSIN HYDROCHLORIDE 0.4 MG: 0.4 CAPSULE ORAL at 14:00

## 2022-11-28 ASSESSMENT — ENCOUNTER SYMPTOMS
VOMITING: 0
ABDOMINAL PAIN: 1
NAUSEA: 1
SHORTNESS OF BREATH: 0
DIARRHEA: 0
COUGH: 0

## 2022-11-28 ASSESSMENT — PAIN DESCRIPTION - LOCATION: LOCATION: FLANK

## 2022-11-28 ASSESSMENT — PAIN SCALES - GENERAL
PAINLEVEL_OUTOF10: 6
PAINLEVEL_OUTOF10: 7
PAINLEVEL_OUTOF10: 10

## 2022-11-28 ASSESSMENT — PAIN DESCRIPTION - ORIENTATION: ORIENTATION: LEFT

## 2022-11-28 ASSESSMENT — PAIN - FUNCTIONAL ASSESSMENT: PAIN_FUNCTIONAL_ASSESSMENT: 0-10

## 2022-11-28 NOTE — ED PROVIDER NOTES
[address] [facility] Name: Stephanie Day  MRN: [id number]  [place] 1970  Date of evaluation: 11/28/2022  Provider: Scout Bedolla PA-C  PCP: Kathryn Abdul    Shared Visit or Autonomous Visit: ROBERT. I have evaluated this patient. My supervising physician was available for consultation. CHIEF COMPLAINT       Chief Complaint   Patient presents with    Flank Pain     L sided flank pain that started today. States that yesterday she felt like she couldn't urinate and then today is unable to urinate and having flank pain. HISTORY OF PRESENT ILLNESS   (Location/Symptom, Timing/Onset, Context/Setting, Quality, Duration, Modifying Factors, Severity)  Note limiting factors. Stephanie Day is a 46 y.o. female presenting to the emergency department for evaluation of left-sided flank pain radiates into left lower quadrant abdomen that started today. Urinary symptoms started yesterday urinary frequency urgency but only going small amounts at a time. No fever. No vomiting. The history is provided by the patient. Abdominal Pain  Pain location:  L flank  Pain quality: aching    Pain radiates to:  LLQ  Onset quality:  Sudden  Duration:  1 day  Timing:  Constant  Progression:  Unchanged  Chronicity:  New  Relieved by:  Nothing  Worsened by:  Nothing  Associated symptoms: nausea    Associated symptoms: no chest pain, no cough, no diarrhea, no dysuria, no fever, no shortness of breath and no vomiting        Nursing Notes were reviewed    REVIEW OF SYSTEMS    (2-9 systems for level 4, 10 or more for level 5)     Review of Systems   Constitutional:  Negative for fever. Respiratory:  Negative for cough and shortness of breath. Cardiovascular:  Negative for chest pain. Gastrointestinal:  Positive for abdominal pain and nausea. Negative for diarrhea and vomiting.    Genitourinary:  Positive for difficulty urinating, flank pain, frequency and urgency. Negative for dysuria. All other systems reviewed and are negative. Positives and Pertinent negatives as per HPI. PAST MEDICAL HISTORY     Past Medical History:   Diagnosis Date    Anxiety     Arthritis     Fibromyalgia     Hyperlipidemia     No medications    Hypertension     Incisional hernia     Irritable bowel syndrome     Vertigo          SURGICAL HISTORY     Past Surgical History:   Procedure Laterality Date    ABDOMEN SURGERY  02/2016    ruptured bowel    ABDOMINAL ADHESION SURGERY      ABDOMINAL EXPLORATION SURGERY N/A 02/24/2017    explor lap possible lysis of adhsions possible bowel resection    APPENDECTOMY      CHOLECYSTECTOMY      CHOLECYSTECTOMY, LAPAROSCOPIC N/A 3/28/13    LYSIS OF ADHESIONS    COLONOSCOPY      unsure of the date/ polyps removed    COLONOSCOPY  05/30/2018    Poor prep, procedure aborted. CYSTOSCOPY  9/11/12    lyssa stents placed for surgery and removed    HERNIA REPAIR  01/17/2018    DAVINCI INCISIONAL HERNIA REPAIR WITH MESH    HYSTERECTOMY (CERVIX STATUS UNKNOWN)      PELVIC LAPAROSCOPY  7/3/12    NC COLON CA SCRN NOT HI RSK IND N/A 8/1/2018    COLONOSCOPY performed by Cm Sotelo MD at [address] [facility] EGD TRANSORAL BIOPSY SINGLE/MULTIPLE  8/1/2018    EGD BIOPSY performed by Cm Sotelo MD at [address] [facility]      SALPINGO-OOPHORECTOMY  9/11/12    BILATERAL SALPINGO-OOPHORECTOMY; LYSIS OF ADHESIONS;;RETROPERITONEAL RESECTION DISTAL URETERS    SHOULDER ARTHROSCOPY Left 7/8/14    TUBAL LIGATION      UPPER GASTROINTESTINAL ENDOSCOPY  05/30/2018    Mild distal esophageal ring- dilated with 15 mm, gastritis         CURRENTMEDICATIONS       Previous Medications    MECLIZINE (ANTIVERT) 25 MG TABLET    TAKE 1 TABLET BY MOUTH 3 (THREE) TIMES DAILY AS NEEDED (VERTIGO).     METOPROLOL SUCCINATE (TOPROL XL) 25 MG EXTENDED RELEASE TABLET    Take 25 mg by mouth daily    RIZATRIPTAN (MAXALT) 10 MG TABLET    TAKE 1 TABLET BY MOUTH AS NEEDED FOR MIGRAINE, MAY REPEAT IN 2 HOURS. ROSUVASTATIN (CRESTOR) 5 MG TABLET    TAKE 1 TABLET BY MOUTH EVERY DAY    TOPIRAMATE (TOPAMAX) 50 MG TABLET    Take 50 mg by mouth 2 times daily    VITAMIN D (ERGOCALCIFEROL) 59847 UNITS CAPS CAPSULE    TAKE ONE CAPSULE BY MOUTH ONE TIME PER WEEK         ALLERGIES     Amoxicillin, Eggs [egg white], and Other    FAMILYHISTORY       Family History   Problem Relation Age of Onset    Diabetes Mother     Cancer Father         liver    Cancer Sister         breast    Cancer Brother         spinal    Cancer Maternal Aunt         Breast    Cancer Paternal Aunt         Breast    Cancer Daughter         Cervical          SOCIAL HISTORY       Social History     Socioeconomic History    Marital status: Legally      Spouse name: None    Number of children: None    Years of education: None    Highest education level: None   Tobacco Use    Smoking status: Every Day     Packs/day: 0.50     Years: 20.00     Pack years: 10.00     Types: Cigarettes     Last attempt to quit: 3/20/2018     Years since quittin.6    Smokeless tobacco: Never   Vaping Use    Vaping Use: Former    Substances: Always   Substance and Sexual Activity    Alcohol use: No    Drug use: No    Sexual activity: Never       SCREENINGS    Meghan Coma Scale  Eye Opening: Spontaneous  Best Verbal Response: Oriented  Best Motor Response: Obeys commands  [place] Coma Scale Score: 15        PHYSICAL EXAM    (up to 7 for level 4, 8 or more for level 5)     ED Triage Vitals [22 1258]   BP Temp Temp Source Heart Rate Resp SpO2 Height Weight   (!) 145/87 97.9 °F (36.6 °C) Oral 82 16 100 % 5' 6\" (1.676 m) 170 lb (77.1 kg)       Physical Exam  Vitals and nursing note reviewed. Constitutional:       Appearance: She is well-developed. She is not toxic-appearing. Comments: Appears uncomfortable   HENT:      Head: Normocephalic and atraumatic.       Mouth/Throat:      Mouth: Mucous membranes are moist.      Pharynx: Oropharynx is clear. No pharyngeal swelling, oropharyngeal exudate or posterior oropharyngeal erythema. Eyes:      Conjunctiva/sclera: Conjunctivae normal.      Pupils: Pupils are equal, round, and reactive to light. Neck:      Vascular: No JVD. Cardiovascular:      Rate and Rhythm: Normal rate and regular rhythm. Pulmonary:      Effort: Pulmonary effort is normal. No respiratory distress. Breath sounds: Normal breath sounds. No stridor. No wheezing or rales. Abdominal:      General: Bowel sounds are normal. There is no distension. Palpations: Abdomen is soft. Abdomen is not rigid. There is no mass. Tenderness: There is no abdominal tenderness. There is no right CVA tenderness, left CVA tenderness, guarding or rebound. Comments: No abdominal or CVA tenderness   Musculoskeletal:         General: Normal range of motion. Cervical back: Normal range of motion and neck supple. Skin:     General: Skin is warm and dry. Findings: No rash. Neurological:      Mental Status: She is alert and oriented to person, place, and time. GCS: GCS eye subscore is 4. GCS verbal subscore is 5. GCS motor subscore is 6. Cranial Nerves: No cranial nerve deficit. Sensory: No sensory deficit. Motor: No abnormal muscle tone.       Coordination: Coordination normal.   Psychiatric:         Behavior: Behavior normal.       DIAGNOSTIC RESULTS   LABS:    Labs Reviewed   URINALYSIS WITH REFLEX TO CULTURE - Abnormal; Notable for the following components:       Result Value    Clarity, UA SL CLOUDY (*)     Ketones, Urine TRACE (*)     Blood, Urine LARGE (*)     Protein, UA TRACE (*)     All other components within normal limits   COMPREHENSIVE METABOLIC PANEL W/ REFLEX TO MG FOR LOW K - Abnormal; Notable for the following components:    Glucose 107 (*)     Alkaline Phosphatase 132 (*)     AST 13 (*)     All other components within normal limits   MICROSCOPIC URINALYSIS - Abnormal; Notable for the following components:    RBC, UA 21-50 (*)     Bacteria, UA Rare (*)     All other components within normal limits   CBC WITH AUTO DIFFERENTIAL       Results for orders placed or performed during the hospital encounter of 11/28/22   Urinalysis with Reflex to Culture    Specimen: Urine, clean catch   Result Value Ref Range    Color, UA Yellow Straw/Yellow    Clarity, UA SL CLOUDY (A) Clear    Glucose, Ur Negative Negative mg/dL    Bilirubin Urine Negative Negative    Ketones, Urine TRACE (A) Negative mg/dL    Specific Gravity, UA >=1.030 1.005 - 1.030    Blood, Urine LARGE (A) Negative    pH, UA 5.0 5.0 - 8.0    Protein, UA TRACE (A) Negative mg/dL    Urobilinogen, Urine 0.2 <2.0 E.U./dL    Nitrite, Urine Negative Negative    Leukocyte Esterase, Urine Negative Negative    Microscopic Examination YES     Urine Type NotGiven     Urine Reflex to Culture Not Indicated    CBC with Auto Differential   Result Value Ref Range    WBC 7.8 4.0 - 11.0 K/uL    RBC 4.31 4.00 - 5.20 M/uL    Hemoglobin 14.1 12.0 - 16.0 g/dL    Hematocrit 40.1 36.0 - 48.0 %    MCV 92.9 80.0 - 100.0 fL    MCH 32.7 26.0 - 34.0 pg    MCHC 35.2 31.0 - 36.0 g/dL    RDW 13.4 12.4 - 15.4 %    Platelets 217 052 - 431 K/uL    MPV 7.9 5.0 - 10.5 fL    Neutrophils % 67.9 %    Lymphocytes % 18.8 %    Monocytes % 9.9 %    Eosinophils % 2.2 %    Basophils % 1.2 %    Neutrophils Absolute 5.3 1.7 - 7.7 K/uL    Lymphocytes Absolute 1.5 1.0 - 5.1 K/uL    Monocytes Absolute 0.8 0.0 - 1.3 K/uL    Eosinophils Absolute 0.2 0.0 - 0.6 K/uL    Basophils Absolute 0.1 0.0 - 0.2 K/uL   Comprehensive Metabolic Panel w/ Reflex to MG   Result Value Ref Range    Sodium 139 136 - 145 mmol/L    Potassium reflex Magnesium 3.7 3.5 - 5.1 mmol/L    Chloride 101 99 - 110 mmol/L    CO2 27 21 - 32 mmol/L    Anion Gap 11 3 - 16    Glucose 107 (H) 70 - 99 mg/dL    BUN 11 7 - 20 mg/dL    Creatinine 0.9 0.6 - 1.1 mg/dL    Est, Glom Filt Rate >60 >60    Calcium 9.3 8.3 - 10.6 mg/dL    Total Protein 6.8 6.4 - 8.2 g/dL    Albumin 4.2 3.4 - 5.0 g/dL    Albumin/Globulin Ratio 1.6 1.1 - 2.2    Total Bilirubin <0.2 0.0 - 1.0 mg/dL    Alkaline Phosphatase 132 (H) 40 - 129 U/L    ALT 10 10 - 40 U/L    AST 13 (L) 15 - 37 U/L   Microscopic Urinalysis   Result Value Ref Range    WBC, UA 0-2 0 - 5 /HPF    RBC, UA 21-50 (A) 0 - 4 /HPF    Epithelial Cells, UA 0-1 0 - 5 /HPF    Bacteria, UA Rare (A) None Seen /HPF       All other labs were not returned as of this dictation. EKG: All EKG's are interpreted by the Emergency Department Physician in the absence of a cardiologist.  Please see their note for interpretation of EKG. RADIOLOGY:   Non-plain film images such as CT, Ultrasound and MRI are read by the radiologist. Plain radiographic images are visualized andpreliminarily interpreted by the  ED Provider with the below findings:        Interpretation perthe Radiologist below, if available at the time of this note:    CT ABDOMEN PELVIS WO CONTRAST Additional Contrast? None   Final Result   Moderately obstructing 3 mm distal left ureteral calculus. CT ABDOMEN PELVIS WO CONTRAST Additional Contrast? None    Result Date: 11/28/2022  EXAMINATION: CT OF THE ABDOMEN AND PELVIS WITHOUT CONTRAST 11/28/2022 1:11 pm TECHNIQUE: CT of the abdomen and pelvis was performed without the administration of intravenous contrast. Multiplanar reformatted images are provided for review. Automated exposure control, iterative reconstruction, and/or weight based adjustment of the mA/kV was utilized to reduce the radiation dose to as low as reasonably achievable.  COMPARISON: 10/27/2017 HISTORY: ORDERING SYSTEM PROVIDED HISTORY: left flank pain, LLQ abd pain, urinary symptoms TECHNOLOGIST PROVIDED HISTORY: Reason for exam:->left flank pain, LLQ abd pain, urinary symptoms Additional Contrast?->None Decision Support Exception - unselect if not a suspected or confirmed emergency medical condition->Emergency Medical Condition (MA) Is the patient pregnant?->No Reason for Exam: Left flank pain FINDINGS: Lower Chest: There is no consolidation or effusion. Organs: There is moderate left hydroureteronephrosis down to the level of the urinary bladder where there is a 3 mm calcification lodged within the ureteral vesicular junction. There are no intrarenal calculi. The remainder of the solid abdominal organs are unremarkable. GI/Bowel: There is no bowel dilatation, wall thickening or obstruction. Diverticular changes are scattered throughout the entirety of the colon. Pelvis: The uterus is surgically absent. The bladder is unremarkable. Peritoneum/Retroperitoneum: There is no free air, free fluid or intraperitoneal inflammatory change. There is no adenopathy. Bones/Soft Tissues: There is no acute fracture or aggressive osseous lesion     Moderately obstructing 3 mm distal left ureteral calculus. PROCEDURES   Unless otherwise noted below, none     Procedures    CRITICAL CARE TIME   Critical care provided for this patient of which 0 min were spend on critical care and decision making. 0 min spent on procedures. There was imminent failure of an organ system which required critical intervention to prevent clinically significant progression of life threatening deterioration of the patient's condition to the point of disability or death.       CONSULTS:  None      EMERGENCY DEPARTMENT COURSE and DIFFERENTIAL DIAGNOSIS/MDM:   Vitals:    Vitals:    11/28/22 1258   BP: (!) 145/87   Pulse: 82   Resp: 16   Temp: 97.9 °F (36.6 °C)   TempSrc: Oral   SpO2: 100%   Weight: 170 lb (77.1 kg)   Height: 5' 6\" (1.676 m)       Patient was given thefollowing medications:  Medications   tamsulosin (FLOMAX) capsule 0.4 mg (has no administration in time range)   acetaminophen (TYLENOL) tablet 650 mg (has no administration in time range)   ketorolac (TORADOL) injection 15 mg (15 mg IntraVENous Given 11/28/22 1313)   ondansetron (ZOFRAN) injection 4 mg (4 mg IntraVENous Given 11/28/22 1314)       Is this patient to be included in the SEP-1 Core Measure due to severe sepsis or septic shock? No   Exclusion criteria - the patient is NOT to be included for SEP-1 Core Measure due to: Infection is not suspected       ED course  Patient presented to the ER for evaluation of left flank pain radiates into the left lower quadrant abdomen with urinary symptoms. Afebrile no vomiting. Appears uncomfortable and holding her side but no abdominal or CVA tenderness on exam.  She was given Toradol and Zofran. Work-up obtained. Urinalysis showing 0-2 WBC, 21-50 RBC, not indicative of UTI. On blood work her white count is normal at 7.8. Hemoglobin 14.1. BUN 11. Creatinine 0.9. Glucose 107. CT scan abdomen pelvis showing 3 mm distal left ureteral calculus at UVJ with moderate hydroureter/hydronephrosis. On reevaluation 1:50 PM EST patient is stating she is feeling a lot better she has overall improved appearance on exam.  Discussed results with her. She feels comfortable going home to try to pass the stone at home think there is a good chance she will be able to pass it and has already moved down at the bladder junction. She will be prescribed Percocet for pain, Zofran as needed, Flomax, given urine strainer to go and refer to urology. I did discuss with her strict return precautions specifically to return for any worsening pain, inability to urinate, if she develops any fever or vomiting. She understands and agrees. I estimate there is LOW risk for ACUTE APPENDICITIS, BOWEL OBSTRUCTION, CHOLECYSTITIS, DIVERTICULITIS, INCARCERATED HERNIA, PANCREATITIS, PERFORATED BOWEL, BOWEL ISCHEMIA, GONADAL TORSION, OR CARDIAC ISCHEMIA, thus I consider the discharge disposition reasonable. Also, there is no evidence or peritonitis, sepsis, or toxicity. FINAL IMPRESSION      1. Calculus of distal left ureter    2. Left flank pain    3.  Hematuria, microscopic          DISPOSITION/PLAN   DISPOSITION Decision to Discharge    PATIENT REFERREDTO:  [facility]  [address] [facility]  [address]  [phone]  Call in 1 day  Call for follow-up appointment from Dave Kimball 19 visit    [place] [id number] [facility]  [address] [address] [phone]    If symptoms worsen    DISCHARGE MEDICATIONS:  New Prescriptions    ONDANSETRON (ZOFRAN) 4 MG TABLET    Take 1 tablet by mouth every 8 hours as needed for Nausea    OXYCODONE-ACETAMINOPHEN (PERCOCET) 5-325 MG PER TABLET    Take 1 tablet by mouth every 6 hours as needed for Pain for up to 3 days. Intended supply: 3 days.  Take lowest dose possible to manage pain    TAMSULOSIN (FLOMAX) 0.4 MG CAPSULE    Take 1 capsule by mouth daily for 7 days       DISCONTINUED MEDICATIONS:  Discontinued Medications    No medications on file              (Please note that portions ofthis note were completed with a voice recognition program.  Efforts were made to edit the dictations but occasionally words are mis-transcribed.)    Denita Montiel PA-C (electronically signed)            Khari Avalos PA-C  11/28/22 1869

## 2022-11-28 NOTE — Clinical Note
Kj Griggs was seen and treated in our emergency department on 11/28/2022. She may return to work on 12/01/2022. If you have any questions or concerns, please don't hesitate to call.       Taras Alarcon PA-C

## 2022-11-28 NOTE — ED NOTES
Pt discharge instructions, follow up and rx x 3 reviewed with pt. Pt verbalized understanding. No further needs. Pt discharged at this time.         Vincent Napier RN  11/28/22 [id number]

## 2023-01-20 ENCOUNTER — HOSPITAL ENCOUNTER (EMERGENCY)
Age: 53
Discharge: HOME | End: 2023-01-20
Attending: EMERGENCY MEDICINE
Payer: COMMERCIAL

## 2023-01-20 VITALS
RESPIRATION RATE: 18 BRPM | SYSTOLIC BLOOD PRESSURE: 135 MMHG | HEART RATE: 88 BPM | TEMPERATURE: 97.6 F | WEIGHT: 170 LBS | OXYGEN SATURATION: 98 % | HEIGHT: 66 IN | BODY MASS INDEX: 27.32 KG/M2 | DIASTOLIC BLOOD PRESSURE: 96 MMHG

## 2023-01-20 DIAGNOSIS — L03.211 FACIAL CELLULITIS: Primary | ICD-10-CM

## 2023-01-20 PROCEDURE — 99283 EMERGENCY DEPT VISIT LOW MDM: CPT

## 2023-01-20 RX ORDER — SULFAMETHOXAZOLE AND TRIMETHOPRIM 800; 160 MG/1; MG/1
1 TABLET ORAL 2 TIMES DAILY
Qty: 14 TABLET | Refills: 0 | Status: SHIPPED | OUTPATIENT
Start: 2023-01-20 | End: 2023-01-27

## 2023-01-20 ASSESSMENT — PAIN DESCRIPTION - LOCATION: LOCATION: FACE

## 2023-01-20 ASSESSMENT — PAIN SCALES - GENERAL: PAINLEVEL_OUTOF10: 4

## 2023-01-20 ASSESSMENT — PAIN - FUNCTIONAL ASSESSMENT: PAIN_FUNCTIONAL_ASSESSMENT: 0-10

## 2023-01-20 NOTE — ED PROVIDER NOTES
[address] [facility]. [facility] Emergency Department      CHIEF COMPLAINT  Facial Swelling (Pt states right sided facial swelling since Tuesday. Pt states that it started with a bump on her chin and then the swelling and pain is going up into her cheek and to her ear. Pt denies any difficulty swallowing.)      HISTORY OF PRESENT ILLNESS  Tammy Kimble is a 46 y.o. female with a history of fibromyalgia and hypertension presents stating she is having pain in her right chin and the right side of her face. She noticed a little bump there this past Tuesday. She states she tried to pick at it a little bit and now feels like the pain is extending from that bump to the entire right side of her face. She thinks the right side of her face looks slightly swollen as well. She denies fevers. No difficulty breathing or swallowing. No dental pain. .   No other complaints, modifying factors or associated symptoms. History obtained from the patient. I have reviewed the following from the nursing documentation. Past Medical History:   Diagnosis Date    Anxiety     Arthritis     Fibromyalgia     Hyperlipidemia     No medications    Hypertension     Incisional hernia     Irritable bowel syndrome     Vertigo      Past Surgical History:   Procedure Laterality Date    ABDOMEN SURGERY  02/2016    ruptured bowel    ABDOMINAL ADHESION SURGERY      ABDOMINAL EXPLORATION SURGERY N/A 02/24/2017    explor lap possible lysis of adhsions possible bowel resection    APPENDECTOMY      CHOLECYSTECTOMY      CHOLECYSTECTOMY, LAPAROSCOPIC N/A 3/28/13    LYSIS OF ADHESIONS    COLONOSCOPY      unsure of the date/ polyps removed    COLONOSCOPY  05/30/2018    Poor prep, procedure aborted.     CYSTOSCOPY  9/11/12    lyssa stents placed for surgery and removed    HERNIA REPAIR  01/17/2018    DAVINCI INCISIONAL HERNIA REPAIR WITH MESH    HYSTERECTOMY (CERVIX STATUS UNKNOWN)      PELVIC LAPAROSCOPY  7/3/12    KS COLON CA SCRN NOT HI RSK IND N/A 8/1/2018 COLONOSCOPY performed by Suki Rondon MD at [address] EGD TRANSORAL BIOPSY SINGLE/MULTIPLE  2018    EGD BIOPSY performed by Suki Rondon MD at [address] [facility]      SALPINGO-OOPHORECTOMY  12    BILATERAL SALPINGO-OOPHORECTOMY; LYSIS OF ADHESIONS;;RETROPERITONEAL RESECTION DISTAL URETERS    SHOULDER ARTHROSCOPY Left 14    TUBAL LIGATION      UPPER GASTROINTESTINAL ENDOSCOPY  2018    Mild distal esophageal ring- dilated with 15 mm, gastritis     Family History   Problem Relation Age of Onset    Diabetes Mother     Cancer Father         liver    Cancer Sister         breast    Cancer Brother         spinal    Cancer Maternal Aunt         Breast    Cancer Paternal Aunt         Breast    Cancer Daughter         Cervical     Social History     Socioeconomic History    Marital status: Legally      Spouse name: Not on file    Number of children: Not on file    Years of education: Not on file    Highest education level: Not on file   Occupational History    Not on file   Tobacco Use    Smoking status: Every Day     Packs/day: 0.50     Years: 20.00     Pack years: 10.00     Types: Cigarettes     Last attempt to quit: 3/20/2018     Years since quittin.8    Smokeless tobacco: Never   Vaping Use    Vaping Use: Former    Substances: Always   Substance and Sexual Activity    Alcohol use: No    Drug use: No    Sexual activity: Never   Other Topics Concern    Not on file   Social History Narrative    Not on file     Social Determinants of Health     Financial Resource Strain: Not on file   Food Insecurity: Not on file   Transportation Needs: Not on file   Physical Activity: Not on file   Stress: Not on file   Social Connections: Not on file   Intimate Partner Violence: Not on file   Housing Stability: Not on file     No current facility-administered medications for this encounter.      Current Outpatient Medications   Medication Sig Dispense Refill    sulfamethoxazole-trimethoprim (BACTRIM DS;SEPTRA DS) 800-160 MG per tablet Take 1 tablet by mouth 2 times daily for 7 days 14 tablet 0    rizatriptan (MAXALT) 10 MG tablet TAKE 1 TABLET BY MOUTH AS NEEDED FOR MIGRAINE, MAY REPEAT IN 2 HOURS. topiramate (TOPAMAX) 50 MG tablet Take 50 mg by mouth 2 times daily      rosuvastatin (CRESTOR) 5 MG tablet TAKE 1 TABLET BY MOUTH EVERY DAY      meclizine (ANTIVERT) 25 MG tablet TAKE 1 TABLET BY MOUTH 3 (THREE) TIMES DAILY AS NEEDED (VERTIGO). tamsulosin (FLOMAX) 0.4 MG capsule Take 1 capsule by mouth daily for 7 days 7 capsule 0    ondansetron (ZOFRAN) 4 MG tablet Take 1 tablet by mouth every 8 hours as needed for Nausea 10 tablet 0    vitamin D (ERGOCALCIFEROL) 13728 units CAPS capsule TAKE ONE CAPSULE BY MOUTH ONE TIME PER WEEK  3    metoprolol succinate (TOPROL XL) 25 MG extended release tablet Take 25 mg by mouth daily       Allergies   Allergen Reactions    Amoxicillin      dehydration    Eggs [Egg White]      Severe stomach pain    Other Other (See Comments)     Flu immunization; severely sick due to egg whites in shot       REVIEW OF SYSTEMS    Unless otherwise stated in this report, this patient's positive and negative responses for review of systems (constitutional, eyes, ENT, cardiovascular, respiratory, gastrointestinal, neurological, genitourinary, musculoskeletal, integument systems and systems related to the presenting problem) are either stated in the preceding paragraph, were not pertinent or were negative for the symptoms and/or complaints related to the medical problem. PHYSICAL EXAM  BP (!) 135/96   Pulse 88   Temp 97.6 °F (36.4 °C) (Oral)   Resp 18   Ht 5' 6\" (1.676 m)   Wt 170 lb (77.1 kg)   LMP 01/01/2006   SpO2 98%   BMI 27.44 kg/m²   GENERAL APPEARANCE: Awake and alert. Cooperative. No acute distress. HEAD: Normocephalic. Atraumatic. EYES: PERRL. EOM's grossly intact.   ENT: Mucous membranes are moist.  Very small palpable pea-sized lump on the anterior right chin. Mild surrounding erythema. No fluctuance. No generalized facial swelling. Normal dentition. NECK: Supple, trachea midline. No trismus, drooling or stridor. No submental swelling. HEART: RRR. LUNGS: Respirations unlabored. CTAB. Good air exchange. No wheezes, rales, or rhonchi. Speaking comfortably in full sentences. ABDOMEN: Non-distended. EXTREMITIES: No peripheral edema. MAEE. No acute deformities. SKIN: Warm, dry and intact. No acute rashes. NEUROLOGICAL: Alert and oriented X 3. PSYCHIATRIC: Normal mood and affect. LABS  I have reviewed all labs for this visit. No results found for this visit on 01/20/23. RADIOLOGY  X-RAYS: ALL IMAGES INCLUDING PLAIN FILMS, CT, ULTRASOUND AND MRI HAVE BEEN READ BY THE RADIOLOGIST. No orders to display            I have personally reviewed Xray and Ultrasound images and radiology confirms the interpretation:  No plain films or ultrasounds obtained. Medications administered. (Dose and Route): No medications administered. Sepsis:  Is this patient to be included in the SEP-1 Core Measure due to severe sepsis or septic shock? No   Exclusion criteria - the patient is NOT to be included for SEP-1 Core Measure due to:  2+ SIRS criteria are not met         ED COURSE/MDM:  Patient seen and evaluated. Here the patient is afebrile with normal vitals signs. Old records reviewed: No prior records reviewed. Diagnoses affirmatively addressed, consideration of tests, treatments & admission, including shared decision making:    The patient is well-appearing, nontoxic. She is a very small pea-sized palpable nodule on the right anterior chin likely an infected pimple. There is mild surrounding erythema but I do not see generalized facial swelling. I do not suspect a deep space infection based on her exam.  She has normal dentition and no submental swelling.   I do not suspect Tabatha Miguel angina. Given her overall reassuring exam I do not think she needs lab work. She is afebrile and nontoxic-appearing. I think she is appropriate for an outpatient trial of oral antibiotics. Patient is comfortable with this plan. She will follow-up with primary care for see next week    Consultation summary: No consultations placed. Social determinants of health: No barriers. Labs and imaging reviewed and results discussed with patient. Patient was reassessed as noted above . Plan of care discussed with patient. Patient in agreement with plan. Strict return precautions have been given. Patient was given scripts for the following medications. I counseled patient how to take these medications. Discharge Medication List as of 1/20/2023  2:00 PM        START taking these medications    Details   sulfamethoxazole-trimethoprim (BACTRIM DS;SEPTRA DS) 800-160 MG per tablet Take 1 tablet by mouth 2 times daily for 7 days, Disp-14 tablet, R-0Normal                 CLINICAL IMPRESSION  1. Facial cellulitis        Blood pressure (!) 135/96, pulse 88, temperature 97.6 °F (36.4 °C), temperature source Oral, resp. rate 18, height 5' 6\" (1.676 m), weight 170 lb (77.1 kg), last menstrual period 01/01/2006, SpO2 98 %. [address] was discharged to home in stable condition. George Ovalle MD am the primary clinician of record.     (Please note this note was completed with a voice recognition program.  Efforts were made to edit the dictations but occasionally words are mis-transcribed.)        Doyle Estrada MD  01/22/23 8311

## 2023-01-20 NOTE — DISCHARGE INSTRUCTIONS
You have a very mild infection on your chin. You are being started on antibiotics. You can also apply warm compresses to the area for 20 minutes 3 times daily. Alternate Tylenol and Motrin for pain. Please follow-up with your primary doctor first thing next week. If you feel you are worsening over the weekend, return to the ER immediately.

## 2024-02-02 ENCOUNTER — APPOINTMENT (OUTPATIENT)
Dept: CT IMAGING | Age: 54
End: 2024-02-02
Payer: COMMERCIAL

## 2024-02-02 ENCOUNTER — HOSPITAL ENCOUNTER (EMERGENCY)
Age: 54
Discharge: HOME | End: 2024-02-02
Payer: COMMERCIAL

## 2024-02-02 ENCOUNTER — APPOINTMENT (OUTPATIENT)
Dept: GENERAL RADIOLOGY | Age: 54
End: 2024-02-02
Payer: COMMERCIAL

## 2024-02-02 VITALS
RESPIRATION RATE: 16 BRPM | HEART RATE: 73 BPM | BODY MASS INDEX: 27.32 KG/M2 | WEIGHT: 170 LBS | DIASTOLIC BLOOD PRESSURE: 93 MMHG | SYSTOLIC BLOOD PRESSURE: 142 MMHG | HEIGHT: 66 IN | TEMPERATURE: 98.3 F | OXYGEN SATURATION: 97 %

## 2024-02-02 DIAGNOSIS — I10 HYPERTENSION, UNSPECIFIED TYPE: Primary | ICD-10-CM

## 2024-02-02 DIAGNOSIS — R42 DIZZINESS: ICD-10-CM

## 2024-02-02 LAB
ALBUMIN SERPL-MCNC: 4.1 G/DL (ref 3.4–5)
ALBUMIN/GLOB SERPL: 1.5 {RATIO} (ref 1.1–2.2)
ALP SERPL-CCNC: 129 U/L (ref 40–129)
ALT SERPL-CCNC: 18 U/L (ref 10–40)
ANION GAP SERPL CALCULATED.3IONS-SCNC: 12 MMOL/L (ref 3–16)
AST SERPL-CCNC: 17 U/L (ref 15–37)
BASOPHILS # BLD: 0 K/UL (ref 0–0.2)
BASOPHILS NFR BLD: 0.5 %
BILIRUB SERPL-MCNC: <0.2 MG/DL (ref 0–1)
BUN SERPL-MCNC: 8 MG/DL (ref 7–20)
CALCIUM SERPL-MCNC: 9.9 MG/DL (ref 8.3–10.6)
CHLORIDE SERPL-SCNC: 104 MMOL/L (ref 99–110)
CO2 SERPL-SCNC: 25 MMOL/L (ref 21–32)
CREAT SERPL-MCNC: 0.7 MG/DL (ref 0.6–1.1)
DEPRECATED RDW RBC AUTO: 12.8 % (ref 12.4–15.4)
EOSINOPHIL # BLD: 0.2 K/UL (ref 0–0.6)
EOSINOPHIL NFR BLD: 2.9 %
GFR SERPLBLD CREATININE-BSD FMLA CKD-EPI: >60 ML/MIN/{1.73_M2}
GLUCOSE SERPL-MCNC: 103 MG/DL (ref 70–99)
HCT VFR BLD AUTO: 42 % (ref 36–48)
HGB BLD-MCNC: 14.5 G/DL (ref 12–16)
LYMPHOCYTES # BLD: 1.8 K/UL (ref 1–5.1)
LYMPHOCYTES NFR BLD: 24.9 %
MCH RBC QN AUTO: 33.3 PG (ref 26–34)
MCHC RBC AUTO-ENTMCNC: 34.6 G/DL (ref 31–36)
MCV RBC AUTO: 96.2 FL (ref 80–100)
MONOCYTES # BLD: 0.7 K/UL (ref 0–1.3)
MONOCYTES NFR BLD: 10.1 %
NEUTROPHILS # BLD: 4.4 K/UL (ref 1.7–7.7)
NEUTROPHILS NFR BLD: 61.6 %
PLATELET # BLD AUTO: 250 K/UL (ref 135–450)
PMV BLD AUTO: 8.3 FL (ref 5–10.5)
POTASSIUM SERPL-SCNC: 3.9 MMOL/L (ref 3.5–5.1)
PROT SERPL-MCNC: 6.9 G/DL (ref 6.4–8.2)
RBC # BLD AUTO: 4.36 M/UL (ref 4–5.2)
SODIUM SERPL-SCNC: 141 MMOL/L (ref 136–145)
TROPONIN, HIGH SENSITIVITY: <6 NG/L (ref 0–14)
TROPONIN, HIGH SENSITIVITY: <6 NG/L (ref 0–14)
WBC # BLD AUTO: 7.2 K/UL (ref 4–11)

## 2024-02-02 PROCEDURE — 85025 COMPLETE CBC W/AUTO DIFF WBC: CPT

## 2024-02-02 PROCEDURE — 84484 ASSAY OF TROPONIN QUANT: CPT

## 2024-02-02 PROCEDURE — 93005 ELECTROCARDIOGRAM TRACING: CPT | Performed by: EMERGENCY MEDICINE

## 2024-02-02 PROCEDURE — 80053 COMPREHEN METABOLIC PANEL: CPT

## 2024-02-02 PROCEDURE — 71045 X-RAY EXAM CHEST 1 VIEW: CPT

## 2024-02-02 PROCEDURE — 70450 CT HEAD/BRAIN W/O DYE: CPT

## 2024-02-02 PROCEDURE — 99285 EMERGENCY DEPT VISIT HI MDM: CPT

## 2024-02-02 PROCEDURE — 72125 CT NECK SPINE W/O DYE: CPT

## 2024-02-02 PROCEDURE — 36415 COLL VENOUS BLD VENIPUNCTURE: CPT

## 2024-02-02 ASSESSMENT — PAIN SCALES - GENERAL: PAINLEVEL_OUTOF10: 6

## 2024-02-02 ASSESSMENT — PAIN DESCRIPTION - ORIENTATION: ORIENTATION: UPPER

## 2024-02-02 ASSESSMENT — PAIN - FUNCTIONAL ASSESSMENT: PAIN_FUNCTIONAL_ASSESSMENT: 0-10

## 2024-02-02 ASSESSMENT — PAIN DESCRIPTION - LOCATION: LOCATION: BACK

## 2024-02-02 ASSESSMENT — PAIN DESCRIPTION - DESCRIPTORS: DESCRIPTORS: ACHING

## 2024-02-03 LAB
EKG ATRIAL RATE: 78 BPM
EKG DIAGNOSIS: NORMAL
EKG P AXIS: 63 DEGREES
EKG P-R INTERVAL: 176 MS
EKG Q-T INTERVAL: 388 MS
EKG QRS DURATION: 98 MS
EKG QTC CALCULATION (BAZETT): 442 MS
EKG R AXIS: 51 DEGREES
EKG T AXIS: 47 DEGREES
EKG VENTRICULAR RATE: 78 BPM

## 2024-02-03 PROCEDURE — 93010 ELECTROCARDIOGRAM REPORT: CPT | Performed by: INTERNAL MEDICINE

## 2024-02-05 NOTE — ED PROVIDER NOTES
I was not asked to see this patient.  I was available for consultation if deemed necessary.  I was only asked to interpret the EKG.  Please see NP Sinan's note for care of the patient while in the emergency department and for final disposition.    The Ekg interpreted by me shows  normal sinus rhythm with a rate of 78  Axis is   Normal  QTc is  normal  Intervals and Durations are unremarkable.      ST Segments: no acute change and nonspecific changes  No significant change from prior EKG dated - 11/11/21  No STEMI, incomplete RBBB present today similar to old EKG, no signs of Brugada syndrome currently           Duane Del Angel MD  02/04/24 3089    
Reason for exam:->dizziness, htn Has a \"code stroke\" or \"stroke alert\" been called?->No Decision Support Exception - unselect if not a suspected or confirmed emergency medical condition->Emergency Medical Condition (MA) Reason for Exam: Headaches, dizziness, neck pain; no known injury FINDINGS: BRAIN/VENTRICLES: There is no acute intracranial hemorrhage, mass effect or midline shift.  No abnormal extra-axial fluid collection.  The gray-white differentiation is maintained without evidence of an acute infarct.  There is no evidence of hydrocephalus. ORBITS: The visualized portion of the orbits demonstrate no acute abnormality. SINUSES: The visualized paranasal sinuses and mastoid air cells demonstrate no acute abnormality. SOFT TISSUES/SKULL:  No acute abnormality of the visualized skull or soft tissues.     No acute intracranial abnormality.     XR CHEST PORTABLE    Result Date: 2/2/2024  EXAMINATION: ONE XRAY VIEW OF THE CHEST 2/2/2024 9:12 pm COMPARISON: 11/11/2021 HISTORY: ORDERING SYSTEM PROVIDED HISTORY: pain or SOB TECHNOLOGIST PROVIDED HISTORY: Reason for exam:->pain or SOB Reason for Exam: pain or SOB FINDINGS: Heart size is normal  Aorta is normal.  Lungs are normally expanded and clear. No pleural effusions. [place] in the right humeral head     Lungs are clear       No results found.    PROCEDURES   Unless otherwise noted below, none     Procedures    CRITICAL CARE TIME (.cctime)       PAST MEDICAL HISTORY      has a past medical history of Anxiety, Arthritis, Fibromyalgia, Hyperlipidemia, Hypertension, Incisional hernia, Irritable bowel syndrome, Nausea (06/01/2012), and Vertigo.     EMERGENCY DEPARTMENT COURSE and DIFFERENTIAL DIAGNOSIS/MDM:   Vitals:    Vitals:    02/02/24 2002 02/02/24 2228   BP: (!) 163/105 (!) 142/93   Pulse: 84 73   Resp: 18 16   Temp: 98.3 °F (36.8 °C)    TempSrc: Oral    SpO2: 97% 97%   Weight: 77.1 kg (170 lb)    Height: 1.676 m (5' 6\")        Patient was given the following

## 2024-02-20 ENCOUNTER — HOSPITAL ENCOUNTER (EMERGENCY)
Age: 54
Discharge: HOME | End: 2024-02-20
Attending: EMERGENCY MEDICINE
Payer: COMMERCIAL

## 2024-02-20 ENCOUNTER — APPOINTMENT (OUTPATIENT)
Dept: CT IMAGING | Age: 54
End: 2024-02-20
Payer: COMMERCIAL

## 2024-02-20 VITALS
RESPIRATION RATE: 16 BRPM | WEIGHT: 170 LBS | TEMPERATURE: 98 F | OXYGEN SATURATION: 100 % | SYSTOLIC BLOOD PRESSURE: 120 MMHG | HEART RATE: 72 BPM | DIASTOLIC BLOOD PRESSURE: 83 MMHG | BODY MASS INDEX: 27.32 KG/M2 | HEIGHT: 66 IN

## 2024-02-20 DIAGNOSIS — N30.01 ACUTE CYSTITIS WITH HEMATURIA: ICD-10-CM

## 2024-02-20 DIAGNOSIS — R10.9 FLANK PAIN: Primary | ICD-10-CM

## 2024-02-20 DIAGNOSIS — K92.2 GASTROINTESTINAL HEMORRHAGE, UNSPECIFIED GASTROINTESTINAL HEMORRHAGE TYPE: ICD-10-CM

## 2024-02-20 LAB
ALBUMIN SERPL-MCNC: 4.2 G/DL (ref 3.4–5)
ALBUMIN/GLOB SERPL: 1.5 {RATIO} (ref 1.1–2.2)
ALP SERPL-CCNC: 117 U/L (ref 40–129)
ALT SERPL-CCNC: 10 U/L (ref 10–40)
ANION GAP SERPL CALCULATED.3IONS-SCNC: 13 MMOL/L (ref 3–16)
AST SERPL-CCNC: 12 U/L (ref 15–37)
BACTERIA URNS QL MICRO: ABNORMAL /HPF
BASOPHILS # BLD: 0.1 K/UL (ref 0–0.2)
BASOPHILS NFR BLD: 0.6 %
BILIRUB SERPL-MCNC: 0.4 MG/DL (ref 0–1)
BILIRUB UR QL STRIP.AUTO: NEGATIVE
BUN SERPL-MCNC: 12 MG/DL (ref 7–20)
CALCIUM SERPL-MCNC: 9.6 MG/DL (ref 8.3–10.6)
CHLORIDE SERPL-SCNC: 103 MMOL/L (ref 99–110)
CLARITY UR: CLEAR
CO2 SERPL-SCNC: 25 MMOL/L (ref 21–32)
COLOR UR: ABNORMAL
CREAT SERPL-MCNC: 0.8 MG/DL (ref 0.6–1.1)
DEPRECATED RDW RBC AUTO: 12.8 % (ref 12.4–15.4)
EOSINOPHIL # BLD: 0.1 K/UL (ref 0–0.6)
EOSINOPHIL NFR BLD: 1.6 %
EPI CELLS #/AREA URNS HPF: ABNORMAL /HPF (ref 0–5)
GFR SERPLBLD CREATININE-BSD FMLA CKD-EPI: >60 ML/MIN/{1.73_M2}
GLUCOSE SERPL-MCNC: 159 MG/DL (ref 70–99)
GLUCOSE UR STRIP.AUTO-MCNC: NEGATIVE MG/DL
HCT VFR BLD AUTO: 42.9 % (ref 36–48)
HGB BLD-MCNC: 14.7 G/DL (ref 12–16)
HGB UR QL STRIP.AUTO: NEGATIVE
KETONES UR STRIP.AUTO-MCNC: NEGATIVE MG/DL
LEUKOCYTE ESTERASE UR QL STRIP.AUTO: NEGATIVE
LIPASE SERPL-CCNC: 38 U/L (ref 13–60)
LYMPHOCYTES # BLD: 1.4 K/UL (ref 1–5.1)
LYMPHOCYTES NFR BLD: 16.9 %
MCH RBC QN AUTO: 32.3 PG (ref 26–34)
MCHC RBC AUTO-ENTMCNC: 34.3 G/DL (ref 31–36)
MCV RBC AUTO: 94.3 FL (ref 80–100)
MONOCYTES # BLD: 0.5 K/UL (ref 0–1.3)
MONOCYTES NFR BLD: 6.2 %
NEUTROPHILS # BLD: 6.1 K/UL (ref 1.7–7.7)
NEUTROPHILS NFR BLD: 74.7 %
NITRITE UR QL STRIP.AUTO: POSITIVE
PH UR STRIP.AUTO: 5.5 [PH] (ref 5–8)
PLATELET # BLD AUTO: 253 K/UL (ref 135–450)
PMV BLD AUTO: 7.8 FL (ref 5–10.5)
POTASSIUM SERPL-SCNC: 4 MMOL/L (ref 3.5–5.1)
PROT SERPL-MCNC: 7 G/DL (ref 6.4–8.2)
PROT UR STRIP.AUTO-MCNC: NEGATIVE MG/DL
RBC # BLD AUTO: 4.55 M/UL (ref 4–5.2)
RBC #/AREA URNS HPF: ABNORMAL /HPF (ref 0–4)
SODIUM SERPL-SCNC: 141 MMOL/L (ref 136–145)
SP GR UR STRIP.AUTO: >=1.03 (ref 1–1.03)
UA COMPLETE W REFLEX CULTURE PNL UR: ABNORMAL
UA DIPSTICK W REFLEX MICRO PNL UR: YES
URN SPEC COLLECT METH UR: ABNORMAL
UROBILINOGEN UR STRIP-ACNC: 0.2 E.U./DL
WBC # BLD AUTO: 8.2 K/UL (ref 4–11)
WBC #/AREA URNS HPF: ABNORMAL /HPF (ref 0–5)

## 2024-02-20 PROCEDURE — 6360000004 HC RX CONTRAST MEDICATION: Performed by: EMERGENCY MEDICINE

## 2024-02-20 PROCEDURE — 6370000000 HC RX 637 (ALT 250 FOR IP): Performed by: EMERGENCY MEDICINE

## 2024-02-20 PROCEDURE — 80053 COMPREHEN METABOLIC PANEL: CPT

## 2024-02-20 PROCEDURE — 96375 TX/PRO/DX INJ NEW DRUG ADDON: CPT

## 2024-02-20 PROCEDURE — 99285 EMERGENCY DEPT VISIT HI MDM: CPT

## 2024-02-20 PROCEDURE — 74177 CT ABD & PELVIS W/CONTRAST: CPT

## 2024-02-20 PROCEDURE — 83690 ASSAY OF LIPASE: CPT

## 2024-02-20 PROCEDURE — 85025 COMPLETE CBC W/AUTO DIFF WBC: CPT

## 2024-02-20 PROCEDURE — 2580000003 HC RX 258: Performed by: EMERGENCY MEDICINE

## 2024-02-20 PROCEDURE — 81001 URINALYSIS AUTO W/SCOPE: CPT

## 2024-02-20 PROCEDURE — 36415 COLL VENOUS BLD VENIPUNCTURE: CPT

## 2024-02-20 PROCEDURE — 96374 THER/PROPH/DIAG INJ IV PUSH: CPT

## 2024-02-20 PROCEDURE — 6360000002 HC RX W HCPCS: Performed by: EMERGENCY MEDICINE

## 2024-02-20 RX ORDER — KETOROLAC TROMETHAMINE 30 MG/ML
30 INJECTION, SOLUTION INTRAMUSCULAR; INTRAVENOUS ONCE
Status: COMPLETED | OUTPATIENT
Start: 2024-02-20 | End: 2024-02-20

## 2024-02-20 RX ORDER — CIPROFLOXACIN 500 MG/1
500 TABLET, FILM COATED ORAL 2 TIMES DAILY
Qty: 20 TABLET | Refills: 0 | Status: SHIPPED | OUTPATIENT
Start: 2024-02-20 | End: 2024-03-01

## 2024-02-20 RX ORDER — MECLIZINE HCL 25MG 25 MG/1
25 TABLET, CHEWABLE ORAL 3 TIMES DAILY PRN
COMMUNITY

## 2024-02-20 RX ORDER — ACETAMINOPHEN 325 MG/1
650 TABLET ORAL EVERY 6 HOURS PRN
Qty: 120 TABLET | Refills: 3 | Status: SHIPPED | OUTPATIENT
Start: 2024-02-20

## 2024-02-20 RX ORDER — 0.9 % SODIUM CHLORIDE 0.9 %
1000 INTRAVENOUS SOLUTION INTRAVENOUS ONCE
Status: COMPLETED | OUTPATIENT
Start: 2024-02-20 | End: 2024-02-20

## 2024-02-20 RX ORDER — ONDANSETRON 2 MG/ML
4 INJECTION INTRAMUSCULAR; INTRAVENOUS ONCE
Status: COMPLETED | OUTPATIENT
Start: 2024-02-20 | End: 2024-02-20

## 2024-02-20 RX ORDER — CIPROFLOXACIN 500 MG/1
500 TABLET, FILM COATED ORAL ONCE
Status: COMPLETED | OUTPATIENT
Start: 2024-02-20 | End: 2024-02-20

## 2024-02-20 RX ADMIN — IOMEPROL INJECTION 75 ML: 714 INJECTION, SOLUTION INTRAVASCULAR at 09:51

## 2024-02-20 RX ADMIN — ONDANSETRON 4 MG: 2 INJECTION INTRAMUSCULAR; INTRAVENOUS at 09:20

## 2024-02-20 RX ADMIN — CIPROFLOXACIN 500 MG: 500 TABLET, FILM COATED ORAL at 10:26

## 2024-02-20 RX ADMIN — KETOROLAC TROMETHAMINE 30 MG: 30 INJECTION INTRAMUSCULAR; INTRAVENOUS at 09:20

## 2024-02-20 RX ADMIN — SODIUM CHLORIDE 1000 ML: 9 INJECTION, SOLUTION INTRAVENOUS at 09:19

## 2024-02-20 ASSESSMENT — PAIN DESCRIPTION - LOCATION: LOCATION: FLANK

## 2024-02-20 ASSESSMENT — LIFESTYLE VARIABLES
HOW OFTEN DO YOU HAVE A DRINK CONTAINING ALCOHOL: NEVER
HOW MANY STANDARD DRINKS CONTAINING ALCOHOL DO YOU HAVE ON A TYPICAL DAY: PATIENT DOES NOT DRINK

## 2024-02-20 ASSESSMENT — PAIN SCALES - GENERAL: PAINLEVEL_OUTOF10: 7

## 2024-02-20 ASSESSMENT — PAIN DESCRIPTION - ONSET: ONSET: ON-GOING

## 2024-02-20 ASSESSMENT — PAIN DESCRIPTION - PAIN TYPE: TYPE: ACUTE PAIN

## 2024-02-20 ASSESSMENT — PAIN DESCRIPTION - FREQUENCY: FREQUENCY: CONTINUOUS

## 2024-02-20 ASSESSMENT — PAIN DESCRIPTION - ORIENTATION: ORIENTATION: LEFT

## 2024-02-20 ASSESSMENT — PAIN - FUNCTIONAL ASSESSMENT
PAIN_FUNCTIONAL_ASSESSMENT: 0-10
PAIN_FUNCTIONAL_ASSESSMENT: ACTIVITIES ARE NOT PREVENTED

## 2024-02-20 ASSESSMENT — PAIN DESCRIPTION - DESCRIPTORS: DESCRIPTORS: THROBBING

## 2024-02-20 NOTE — DISCHARGE INSTRUCTIONS
Your CT scan here today did not show any acute findings.  Your urine was suggestive of urinary tract infection.  We will provide you a short course of antibiotics.  Because you did have some GI bleeding which has now stopped is important to call your doctor to set up follow-up appointment.  If you notice bleeding again please come back to the ER for repeat evaluation

## 2024-02-20 NOTE — ED PROVIDER NOTES
sounds are normal. There is no distension.      Palpations: Abdomen is soft.      Tenderness: There is no abdominal tenderness. There is no guarding or rebound.   Musculoskeletal:         General: No tenderness or deformity. Normal range of motion.        Arms:       Cervical back: Normal range of motion and neck supple.   Skin:     General: Skin is warm and dry.      Findings: No erythema or rash.   Neurological:      Mental Status: She is alert and oriented to person, place, and time.      Cranial Nerves: No cranial nerve deficit.   Psychiatric:         Behavior: Behavior normal.         Thought Content: Thought content normal.         Judgment: Judgment normal.         DIAGNOSTIC RESULTS     EKG: All EKG's are interpreted by the Emergency Department Physician who either signs or Co-signs this chart in the absence of a cardiologist.      RADIOLOGY:   Non-plain film images such as CT, Ultrasound and MRI are read by the radiologist. Plain radiographic images are visualized and preliminarily interpreted by the emergency physician with the below findings:      Interpretation per the Radiologist below, if available at the time of this note:    CT ABDOMEN PELVIS W IV CONTRAST Additional Contrast? None   Final Result   No acute intra-abdominal abnormality               ED BEDSIDE ULTRASOUND:   Performed by ED Physician - none    LABS:  Labs Reviewed   COMPREHENSIVE METABOLIC PANEL W/ REFLEX TO MG FOR LOW K - Abnormal; Notable for the following components:       Result Value    Glucose 159 (*)     AST 12 (*)     All other components within normal limits   URINALYSIS WITH REFLEX TO CULTURE - Abnormal; Notable for the following components:    Color, UA DARK YELLOW (*)     Nitrite, Urine POSITIVE (*)     All other components within normal limits   MICROSCOPIC URINALYSIS - Abnormal; Notable for the following components:    Bacteria, UA 2+ (*)     All other components within normal limits   CBC WITH AUTO DIFFERENTIAL   LIPASE

## 2024-02-20 NOTE — ED NOTES
AVS provided and reviewed with the patient. The patient verbalized understanding of care at home, follow up care, and emergent symptoms to return for. The patient verbalized understanding of completing entire medication course as prescribed. No questions or concerns verbalized at this time. The patient is alert, oriented, stable, and ambulatory out of the department at the time of discharge.

## 2025-06-17 ENCOUNTER — TRANSCRIBE ORDERS (OUTPATIENT)
Dept: ADMINISTRATIVE | Age: 55
End: 2025-06-17

## 2025-06-17 ENCOUNTER — HOSPITAL ENCOUNTER (EMERGENCY)
Age: 55
Discharge: HOME | End: 2025-06-18
Attending: EMERGENCY MEDICINE
Payer: COMMERCIAL

## 2025-06-17 ENCOUNTER — APPOINTMENT (OUTPATIENT)
Dept: GENERAL RADIOLOGY | Age: 55
End: 2025-06-17
Payer: COMMERCIAL

## 2025-06-17 ENCOUNTER — APPOINTMENT (OUTPATIENT)
Dept: CT IMAGING | Age: 55
End: 2025-06-17
Payer: COMMERCIAL

## 2025-06-17 VITALS
HEART RATE: 72 BPM | OXYGEN SATURATION: 97 % | TEMPERATURE: 98.7 F | RESPIRATION RATE: 15 BRPM | DIASTOLIC BLOOD PRESSURE: 86 MMHG | SYSTOLIC BLOOD PRESSURE: 119 MMHG | BODY MASS INDEX: 30.44 KG/M2 | WEIGHT: 188.6 LBS

## 2025-06-17 DIAGNOSIS — R07.9 CHEST PAIN, UNSPECIFIED TYPE: Primary | ICD-10-CM

## 2025-06-17 DIAGNOSIS — R10.84 GENERALIZED ABDOMINAL PAIN: ICD-10-CM

## 2025-06-17 DIAGNOSIS — N64.4 PAIN OF LEFT BREAST: Primary | ICD-10-CM

## 2025-06-17 LAB
ALBUMIN SERPL-MCNC: 4.2 G/DL (ref 3.4–5)
ALBUMIN/GLOB SERPL: 1.6 {RATIO} (ref 1.1–2.2)
ALP SERPL-CCNC: 124 U/L (ref 40–129)
ALT SERPL-CCNC: 14 U/L (ref 10–40)
ANION GAP SERPL CALCULATED.3IONS-SCNC: 13 MMOL/L (ref 3–16)
AST SERPL-CCNC: 13 U/L (ref 15–37)
BACTERIA URNS QL MICRO: ABNORMAL /HPF
BASOPHILS # BLD: 0.1 K/UL (ref 0–0.2)
BASOPHILS NFR BLD: 0.8 %
BILIRUB SERPL-MCNC: <0.2 MG/DL (ref 0–1)
BILIRUB UR QL STRIP.AUTO: NEGATIVE
BUN SERPL-MCNC: 9 MG/DL (ref 7–20)
CALCIUM SERPL-MCNC: 9.3 MG/DL (ref 8.3–10.6)
CHLORIDE SERPL-SCNC: 105 MMOL/L (ref 99–110)
CLARITY UR: CLEAR
CO2 SERPL-SCNC: 25 MMOL/L (ref 21–32)
COLOR UR: YELLOW
CREAT SERPL-MCNC: 0.7 MG/DL (ref 0.6–1.1)
DEPRECATED RDW RBC AUTO: 13 % (ref 12.4–15.4)
EOSINOPHIL # BLD: 0.3 K/UL (ref 0–0.6)
EOSINOPHIL NFR BLD: 3.1 %
EPI CELLS #/AREA URNS HPF: ABNORMAL /HPF (ref 0–5)
GFR SERPLBLD CREATININE-BSD FMLA CKD-EPI: >90 ML/MIN/{1.73_M2}
GLUCOSE SERPL-MCNC: 113 MG/DL (ref 70–99)
GLUCOSE UR STRIP.AUTO-MCNC: NEGATIVE MG/DL
HCT VFR BLD AUTO: 42 % (ref 36–48)
HGB BLD-MCNC: 14.3 G/DL (ref 12–16)
HGB UR QL STRIP.AUTO: NEGATIVE
KETONES UR STRIP.AUTO-MCNC: NEGATIVE MG/DL
LEUKOCYTE ESTERASE UR QL STRIP.AUTO: ABNORMAL
LIPASE SERPL-CCNC: 44 U/L (ref 13–60)
LYMPHOCYTES # BLD: 2.7 K/UL (ref 1–5.1)
LYMPHOCYTES NFR BLD: 29 %
MCH RBC QN AUTO: 32.2 PG (ref 26–34)
MCHC RBC AUTO-ENTMCNC: 34 G/DL (ref 31–36)
MCV RBC AUTO: 94.8 FL (ref 80–100)
MONOCYTES # BLD: 0.6 K/UL (ref 0–1.3)
MONOCYTES NFR BLD: 6.6 %
NEUTROPHILS # BLD: 5.7 K/UL (ref 1.7–7.7)
NEUTROPHILS NFR BLD: 60.5 %
NITRITE UR QL STRIP.AUTO: NEGATIVE
PH UR STRIP.AUTO: 6 [PH] (ref 5–8)
PLATELET # BLD AUTO: 255 K/UL (ref 135–450)
PMV BLD AUTO: 8 FL (ref 5–10.5)
POTASSIUM SERPL-SCNC: 3.7 MMOL/L (ref 3.5–5.1)
PROT SERPL-MCNC: 6.8 G/DL (ref 6.4–8.2)
PROT UR STRIP.AUTO-MCNC: NEGATIVE MG/DL
RBC # BLD AUTO: 4.43 M/UL (ref 4–5.2)
RBC #/AREA URNS HPF: ABNORMAL /HPF (ref 0–4)
SODIUM SERPL-SCNC: 143 MMOL/L (ref 136–145)
SP GR UR STRIP.AUTO: <=1.005 (ref 1–1.03)
TROPONIN, HIGH SENSITIVITY: <6 NG/L (ref 0–14)
TROPONIN, HIGH SENSITIVITY: <6 NG/L (ref 0–14)
UA COMPLETE W REFLEX CULTURE PNL UR: ABNORMAL
UA DIPSTICK W REFLEX MICRO PNL UR: YES
URN SPEC COLLECT METH UR: ABNORMAL
UROBILINOGEN UR STRIP-ACNC: 0.2 E.U./DL
WBC # BLD AUTO: 9.4 K/UL (ref 4–11)
WBC #/AREA URNS HPF: ABNORMAL /HPF (ref 0–5)

## 2025-06-17 PROCEDURE — 80053 COMPREHEN METABOLIC PANEL: CPT

## 2025-06-17 PROCEDURE — 93005 ELECTROCARDIOGRAM TRACING: CPT | Performed by: EMERGENCY MEDICINE

## 2025-06-17 PROCEDURE — 81001 URINALYSIS AUTO W/SCOPE: CPT

## 2025-06-17 PROCEDURE — 84484 ASSAY OF TROPONIN QUANT: CPT

## 2025-06-17 PROCEDURE — 99285 EMERGENCY DEPT VISIT HI MDM: CPT

## 2025-06-17 PROCEDURE — 85025 COMPLETE CBC W/AUTO DIFF WBC: CPT

## 2025-06-17 PROCEDURE — 83690 ASSAY OF LIPASE: CPT

## 2025-06-17 PROCEDURE — 74176 CT ABD & PELVIS W/O CONTRAST: CPT

## 2025-06-17 PROCEDURE — 71045 X-RAY EXAM CHEST 1 VIEW: CPT

## 2025-06-17 PROCEDURE — 84443 ASSAY THYROID STIM HORMONE: CPT

## 2025-06-17 PROCEDURE — 36415 COLL VENOUS BLD VENIPUNCTURE: CPT

## 2025-06-17 ASSESSMENT — PAIN DESCRIPTION - PAIN TYPE: TYPE: ACUTE PAIN

## 2025-06-17 ASSESSMENT — PAIN DESCRIPTION - LOCATION: LOCATION: CHEST

## 2025-06-17 ASSESSMENT — PAIN DESCRIPTION - ORIENTATION: ORIENTATION: MID

## 2025-06-17 ASSESSMENT — PAIN DESCRIPTION - ONSET: ONSET: ON-GOING

## 2025-06-17 ASSESSMENT — PAIN SCALES - GENERAL: PAINLEVEL_OUTOF10: 5

## 2025-06-17 ASSESSMENT — PAIN DESCRIPTION - FREQUENCY: FREQUENCY: CONTINUOUS

## 2025-06-17 ASSESSMENT — PAIN DESCRIPTION - DESCRIPTORS: DESCRIPTORS: ACHING

## 2025-06-17 ASSESSMENT — PAIN - FUNCTIONAL ASSESSMENT
PAIN_FUNCTIONAL_ASSESSMENT: ACTIVITIES ARE NOT PREVENTED
PAIN_FUNCTIONAL_ASSESSMENT: 0-10

## 2025-06-17 NOTE — ED PROVIDER NOTES
commands  [place] Coma Scale Score: 15           CIWA Assessment  BP: 119/86  Pulse: 72          SOCIAL HISTORY:  Social History     Tobacco Use    Smoking status: Every Day     Current packs/day: 0.00     Average packs/day: 0.5 packs/day for 20.0 years (10.0 ttl pk-yrs)     Types: Cigarettes     Start date: 3/20/1998     Last attempt to quit: 3/20/2018     Years since quittin.2    Smokeless tobacco: Never   Vaping Use    Vaping status: Former    Substances: Always   Substance Use Topics    Alcohol use: No    Drug use: No     ALLERGIES: Amoxicillin, Eggs [egg white], and Other    PHYSICAL EXAM:  Vitals during ED course were reviewed and are as charted.    Vitals:    25 2152 25 2202 25 2232 25 2345   BP:  124/82 (!) 128/92 119/86   Pulse: 71 75 74 72   Resp: 21 16 21 15   Temp:       TempSrc:       SpO2: 98%  97%    Weight:         Constitutional: Mild acute distress, Non-toxic appearance  HENT: Normocephalic, Atraumatic, Bilateral external ears normal, Oropharynx moist, No oral exudates, Nasal exam normal  Eyes:  PERRLA, EOMI, Conjunctiva normal, No discharge  Neck: Normal range of motion, No tenderness, Supple, No lymphadenopathy, No stridor  Cardiovascular:  Normal heart rate, Normal rhythm, No murmurs, No rubs, No gallops  Pulmonary/Chest:  Normal breath sounds, No respiratory distress, No wheezing, No  chest tenderness  Abdomen:  Bowel sounds normal, Soft, moderate diffuse tenderness, No guarding or rebound, No masses, No pulsatile masses  Back: Mild diffuse upper back tenderness, No CVA tenderness  Extremities:  Normal range of motion, Intact distal pulses, No edema, No tenderness  Lymphatic:  No lymphadenopathy noted  Neurologic:  Alert & oriented x 3, Normal motor function, Normal sensory function, No focal defecits  Skin:  Warm, Dry, No erythema, No rash    EKG:    EKG Interpretation    Interpreted by me    Rhythm: sinus   Rate: normal  Axis: normal  Ectopy: none  Conduction:

## 2025-06-18 LAB
EKG ATRIAL RATE: 93 BPM
EKG DIAGNOSIS: NORMAL
EKG P AXIS: 65 DEGREES
EKG P-R INTERVAL: 166 MS
EKG Q-T INTERVAL: 366 MS
EKG QRS DURATION: 96 MS
EKG QTC CALCULATION (BAZETT): 455 MS
EKG R AXIS: 16 DEGREES
EKG T AXIS: 43 DEGREES
EKG VENTRICULAR RATE: 93 BPM
TSH SERPL DL<=0.005 MIU/L-ACNC: 2.07 UIU/ML (ref 0.27–4.2)

## 2025-06-18 PROCEDURE — 93010 ELECTROCARDIOGRAM REPORT: CPT | Performed by: INTERNAL MEDICINE

## 2025-06-18 ASSESSMENT — PAIN - FUNCTIONAL ASSESSMENT: PAIN_FUNCTIONAL_ASSESSMENT: NONE - DENIES PAIN

## 2025-06-21 ENCOUNTER — RESULTS FOLLOW-UP (OUTPATIENT)
Age: 55
End: 2025-06-21

## (undated) DEVICE — COVER,LIGHT HANDLE,FLX,2/PK: Brand: MEDLINE INDUSTRIES, INC.

## (undated) DEVICE — GLOVE ORANGE PI 7 1/2   MSG9075

## (undated) DEVICE — GOWN,SIRUS,POLYRNF,BRTHSLV,XL,30/CS: Brand: MEDLINE

## (undated) DEVICE — DRAPE,REIN 53X77,STERILE: Brand: MEDLINE

## (undated) DEVICE — FORCEP BX STD CAP 240CM RAD JAW 4